# Patient Record
Sex: FEMALE | Race: WHITE | NOT HISPANIC OR LATINO | Employment: FULL TIME | URBAN - METROPOLITAN AREA
[De-identification: names, ages, dates, MRNs, and addresses within clinical notes are randomized per-mention and may not be internally consistent; named-entity substitution may affect disease eponyms.]

---

## 2017-01-03 ENCOUNTER — TRANSFERRED RECORDS (OUTPATIENT)
Dept: FAMILY MEDICINE | Facility: CLINIC | Age: 47
End: 2017-01-03

## 2017-01-05 ENCOUNTER — TELEPHONE (OUTPATIENT)
Dept: FAMILY MEDICINE | Facility: CLINIC | Age: 47
End: 2017-01-05

## 2017-01-05 NOTE — TELEPHONE ENCOUNTER
Called patient with MRI results.  She states she saw Dr Keyla Perdue today and they are ordering PT that will be done at [facility].

## 2017-02-06 ENCOUNTER — TRANSFERRED RECORDS (OUTPATIENT)
Dept: FAMILY MEDICINE | Facility: CLINIC | Age: 47
End: 2017-02-06

## 2017-03-15 ENCOUNTER — OFFICE VISIT (OUTPATIENT)
Dept: FAMILY MEDICINE | Facility: CLINIC | Age: 47
End: 2017-03-15

## 2017-03-15 VITALS
SYSTOLIC BLOOD PRESSURE: 116 MMHG | HEART RATE: 79 BPM | OXYGEN SATURATION: 98 % | BODY MASS INDEX: 35.67 KG/M2 | WEIGHT: 221 LBS | DIASTOLIC BLOOD PRESSURE: 72 MMHG

## 2017-03-15 DIAGNOSIS — M75.102 ROTATOR CUFF SYNDROME, LEFT: Primary | ICD-10-CM

## 2017-03-15 PROCEDURE — 99213 OFFICE O/P EST LOW 20 MIN: CPT | Performed by: FAMILY MEDICINE

## 2017-03-15 NOTE — MR AVS SNAPSHOT
"              After Visit Summary   3/15/2017    Kourtney Rondon    MRN: [id number]           Patient Information     Date Of Birth          1970        Visit Information        Provider Department      3/15/2017 9:15 AM Nathan Morris MD [facility]        Today's Diagnoses     Rotator cuff syndrome, left    -  1       Follow-ups after your visit        Who to contact     If you have questions or need follow up information about today's clinic visit or your schedule please contact [facility] directly at [phone].  Normal or non-critical lab and imaging results will be communicated to you by [employer], letter or phone within 4 business days after the clinic has received the results. If you do not hear from us within 7 days, please contact the clinic through [employer] or phone. If you have a critical or abnormal lab result, we will notify you by phone as soon as possible.  Submit refill requests through [employer] or call your pharmacy and they will forward the refill request to us. Please allow 3 business days for your refill to be completed.          Additional Information About Your Visit        MyChart Information     [employer] lets you send messages to your doctor, view your test results, renew your prescriptions, schedule appointments and more. To sign up, go to [email or web address] . Click on \"Log in\" on the left side of the screen, which will take you to the Welcome page. Then click on \"Sign up Now\" on the right side of the page.     You will be asked to enter the access code listed below, as well as some personal information. Please follow the directions to create your username and password.     Your access code is: [email or web address]  Expires: 3/30/2017  9:01 AM     Your access code will  in 90 days. If you need help or a new code, please call your [place] clinic or [phone].        Care EveryWhere ID     This is your Care EveryWhere ID. This could be used by other " organizations to access your [place] medical records  [id number]        Your Vitals Were     Pulse Pulse Oximetry BMI (Body Mass Index)             79 98% 35.67 kg/m2          Blood Pressure from Last 3 Encounters:   03/15/17 116/72   12/30/16 102/70   06/14/16 100/76    Weight from Last 3 Encounters:   03/15/17 100.2 kg (221 lb)   12/30/16 97.5 kg (215 lb)   06/14/16 97.5 kg (215 lb)              Today, you had the following     No orders found for display         Today's Medication Changes          These changes are accurate as of: 3/15/17  9:32 AM.  If you have any questions, ask your nurse or doctor.               These medicines have changed or have updated prescriptions.        Dose/Directions    SYNTHROID PO   This may have changed:  Another medication with the same name was removed. Continue taking this medication, and follow the directions you see here.   Changed by:  Nathan Morris MD        Dose:  150 mcg   Take 150 mcg by mouth   Refills:  0                Primary Care Provider Office Phone # Fax #    Nathan Morris [phone]       [facility] [address] [facility] [address]        Thank you!     Thank you for choosing [facility]  for your care. Our goal is always to provide you with excellent care. Hearing back from our patients is one way we can continue to improve our services. Please take a few minutes to complete the written survey that you may receive in the mail after your visit with us. Thank you!             Your Updated Medication List - Protect others around you: Learn how to safely use, store and throw away your medicines at www.disposemymeds.org.          This list is accurate as of: 3/15/17  9:32 AM.  Always use your most recent med list.                   Brand Name Dispense Instructions for use    ALEVE PO      Take by mouth daily       BIOTIN PO          buPROPion 150 MG 12 hr tablet    WELLBUTRIN SR         citalopram 40 MG  tablet    celeXA     Take 40 mg by mouth daily.       gabapentin 100 MG capsule    NEURONTIN    180 capsule    Take 1 capsule by mouth bid.       SYNTHROID PO      Take 150 mcg by mouth       VITAMIN B-12 PO

## 2017-03-15 NOTE — PROGRESS NOTES
She has rotator cuff syndrome of the left shoulder, starting PT, but her therapist suggested no lifting over 5#.   OBJECTIVE: /72  Pulse 79  Wt 100.2 kg (221 lb)  SpO2 98%  BMI 35.67 kg/m2   NAD =/- supraspinatus sign. Painful to abduct above shoulder. Forward flexion OK to shoulder.   (M75.102) Rotator cuff syndrome, left  (primary encounter diagnosis)  Comment:   Plan: letter written to lift no more than 5#, and not above her waist. 2 month limit.

## 2017-07-22 ENCOUNTER — HEALTH MAINTENANCE LETTER (OUTPATIENT)
Age: 47
End: 2017-07-22

## 2017-07-24 ENCOUNTER — TELEPHONE (OUTPATIENT)
Dept: FAMILY MEDICINE | Facility: CLINIC | Age: 47
End: 2017-07-24

## 2017-07-24 NOTE — TELEPHONE ENCOUNTER
Patient calls requesting note from Dr. Morris to release her from 5 lb lifting restriction at work. Saw Dr. Morris in March for shoulder problem and letter with this restriction was given. Patient reports shoulder if feeling good but needs MD not to release from this restriction.   Plan: ok per Dr. Morris for letter stating ok to work without any restrictions. Letter done and patient will pick it up tomorrow at .  Tammi Longoria RN

## 2017-07-24 NOTE — LETTER
[facility] [address] [place] [id number] [phone]        July 24, 2017      To whom it may concern--     Kourtney Rondon may work without any restrictions. Lifting restriction no longer necessary.               Nathan Morris MD

## 2018-05-03 ENCOUNTER — TRANSFERRED RECORDS (OUTPATIENT)
Dept: FAMILY MEDICINE | Facility: CLINIC | Age: 48
End: 2018-05-03

## 2018-05-08 ENCOUNTER — TRANSFERRED RECORDS (OUTPATIENT)
Dept: FAMILY MEDICINE | Facility: CLINIC | Age: 48
End: 2018-05-08

## 2018-05-10 ENCOUNTER — TRANSFERRED RECORDS (OUTPATIENT)
Dept: FAMILY MEDICINE | Facility: CLINIC | Age: 48
End: 2018-05-10

## 2018-05-22 ENCOUNTER — TRANSFERRED RECORDS (OUTPATIENT)
Dept: FAMILY MEDICINE | Facility: CLINIC | Age: 48
End: 2018-05-22

## 2018-06-14 ENCOUNTER — TRANSFERRED RECORDS (OUTPATIENT)
Dept: FAMILY MEDICINE | Facility: CLINIC | Age: 48
End: 2018-06-14

## 2018-07-12 ENCOUNTER — TRANSFERRED RECORDS (OUTPATIENT)
Dept: FAMILY MEDICINE | Facility: CLINIC | Age: 48
End: 2018-07-12

## 2018-08-20 ENCOUNTER — TRANSFERRED RECORDS (OUTPATIENT)
Dept: FAMILY MEDICINE | Facility: CLINIC | Age: 48
End: 2018-08-20

## 2018-09-20 ENCOUNTER — TRANSFERRED RECORDS (OUTPATIENT)
Dept: FAMILY MEDICINE | Facility: CLINIC | Age: 48
End: 2018-09-20

## 2019-03-10 ENCOUNTER — TRANSFERRED RECORDS (OUTPATIENT)
Dept: FAMILY MEDICINE | Facility: CLINIC | Age: 49
End: 2019-03-10

## 2019-03-29 ENCOUNTER — TRANSFERRED RECORDS (OUTPATIENT)
Dept: FAMILY MEDICINE | Facility: CLINIC | Age: 49
End: 2019-03-29

## 2019-04-24 ENCOUNTER — TRANSFERRED RECORDS (OUTPATIENT)
Dept: FAMILY MEDICINE | Facility: CLINIC | Age: 49
End: 2019-04-24

## 2019-05-17 ENCOUNTER — TRANSFERRED RECORDS (OUTPATIENT)
Dept: FAMILY MEDICINE | Facility: CLINIC | Age: 49
End: 2019-05-17

## 2019-06-13 ENCOUNTER — TRANSFERRED RECORDS (OUTPATIENT)
Dept: FAMILY MEDICINE | Facility: CLINIC | Age: 49
End: 2019-06-13

## 2019-06-27 ENCOUNTER — TRANSFERRED RECORDS (OUTPATIENT)
Dept: FAMILY MEDICINE | Facility: CLINIC | Age: 49
End: 2019-06-27

## 2019-07-01 ENCOUNTER — TRANSFERRED RECORDS (OUTPATIENT)
Dept: FAMILY MEDICINE | Facility: CLINIC | Age: 49
End: 2019-07-01

## 2019-09-05 ENCOUNTER — TRANSFERRED RECORDS (OUTPATIENT)
Dept: FAMILY MEDICINE | Facility: CLINIC | Age: 49
End: 2019-09-05

## 2019-09-09 ENCOUNTER — TRANSFERRED RECORDS (OUTPATIENT)
Dept: FAMILY MEDICINE | Facility: CLINIC | Age: 49
End: 2019-09-09

## 2019-09-12 ENCOUNTER — OFFICE VISIT (OUTPATIENT)
Dept: FAMILY MEDICINE | Facility: CLINIC | Age: 49
End: 2019-09-12

## 2019-09-12 VITALS
BODY MASS INDEX: 34.86 KG/M2 | WEIGHT: 216 LBS | SYSTOLIC BLOOD PRESSURE: 94 MMHG | HEART RATE: 88 BPM | DIASTOLIC BLOOD PRESSURE: 70 MMHG | OXYGEN SATURATION: 96 % | RESPIRATION RATE: 16 BRPM

## 2019-09-12 DIAGNOSIS — M25.562 CHRONIC PAIN OF LEFT KNEE: ICD-10-CM

## 2019-09-12 DIAGNOSIS — E03.9 ACQUIRED HYPOTHYROIDISM: ICD-10-CM

## 2019-09-12 DIAGNOSIS — Z01.818 PREOP GENERAL PHYSICAL EXAM: Primary | ICD-10-CM

## 2019-09-12 DIAGNOSIS — G89.29 CHRONIC PAIN OF LEFT KNEE: ICD-10-CM

## 2019-09-12 LAB
% GRANULOCYTES: 76.2 % (ref 42.2–75.2)
HCT VFR BLD AUTO: 39.1 % (ref 35–46)
HEMOGLOBIN: 13.1 G/DL (ref 11.8–15.5)
LYMPHOCYTES NFR BLD AUTO: 17.9 % (ref 20.5–51.1)
MCH RBC QN AUTO: 30.5 PG (ref 27–31)
MCHC RBC AUTO-ENTMCNC: 33.5 G/DL (ref 33–37)
MCV RBC AUTO: 91 FL (ref 80–100)
MONOCYTES NFR BLD AUTO: 5.9 % (ref 1.7–9.3)
PLATELET # BLD AUTO: 222 K/UL (ref 140–450)
RBC # BLD AUTO: 4.3 X10/CMM (ref 3.7–5.2)
WBC # BLD AUTO: 6.9 X10/CMM (ref 3.8–11)

## 2019-09-12 PROCEDURE — 99214 OFFICE O/P EST MOD 30 MIN: CPT | Mod: 25 | Performed by: FAMILY MEDICINE

## 2019-09-12 PROCEDURE — 85025 COMPLETE CBC W/AUTO DIFF WBC: CPT | Performed by: FAMILY MEDICINE

## 2019-09-12 PROCEDURE — 36415 COLL VENOUS BLD VENIPUNCTURE: CPT | Performed by: FAMILY MEDICINE

## 2019-09-12 PROCEDURE — 90471 IMMUNIZATION ADMIN: CPT | Performed by: FAMILY MEDICINE

## 2019-09-12 PROCEDURE — 93000 ELECTROCARDIOGRAM COMPLETE: CPT | Performed by: FAMILY MEDICINE

## 2019-09-12 PROCEDURE — 90686 IIV4 VACC NO PRSV 0.5 ML IM: CPT | Performed by: FAMILY MEDICINE

## 2019-09-12 RX ORDER — TRAMADOL HYDROCHLORIDE 50 MG/1
50 TABLET ORAL EVERY 6 HOURS PRN
COMMUNITY
End: 2020-09-18

## 2019-09-12 NOTE — PROGRESS NOTES
[facility]  [address] [place] [id number] [phone]  Dept: [phone]    PRE-OP EVALUATION:  Today's date: 2019    Kourtney Rondon (: 1970) presents for pre-operative evaluation assessment as requested by Dr. Nacho Cheng.  She requires evaluation and anesthesia risk assessment prior to undergoing surgery/procedure for treatment of Lft knee replace .    Proposed Surgery/ Procedure: left knee replace  Date of Surgery/ Procedure: 10/2/19  Time of Surgery/ Procedure: 6:30am  Hospital/Surgical Facility: [facility]  [phone]  Primary Physician: Walt Darby  Type of Anesthesia Anticipated: to be determined    Patient has a Health Care Directive or Living Will:  YES     1. NO - Do you have a history of heart attack, stroke, stent, bypass or surgery on an artery in the head, neck, heart or legs?  2. NO - Do you ever have any pain or discomfort in your chest?  3. NO - Do you have a history of  Heart Failure?  4. NO - Are you troubled by shortness of breath when: walking on the level, up a slight hill or at night?  5. NO - Do you currently have a cold, bronchitis or other respiratory infection?  6. NO - Do you have a cough, shortness of breath or wheezing?  7. NO - Do you sometimes get pains in the calves of your legs when you walk?  8. NO - Do you or anyone in your family have previous history of blood clots?  9. NO - Do you or does anyone in your family have a serious bleeding problem such as prolonged bleeding following surgeries or cuts?  10. NO - Have you ever had problems with anemia or been told to take iron pills?  11. NO - Have you had any abnormal blood loss such as black, tarry or bloody stools, or abnormal vaginal bleeding?  12. NO - Have you ever had a blood transfusion?  13. NO - Have you or any of your relatives ever had problems with anesthesia?  14. NO - Do you have sleep apnea, excessive snoring or daytime drowsiness?  15. NO - Do you have any prosthetic  heart valves?  16. NO - Do you have prosthetic joints?  17. NO - Is there any chance that you may be pregnant?      HPI:     HPI related to upcoming procedure: ongoing left knee pain, related to injury to the meniscus and repair and now a subsequent injury 6 months ago.    See problem list for active medical problems.  Problems all longstanding and stable, except as noted/documented.  See ROS for pertinent symptoms related to these conditions.    MEDICAL HISTORY:     Patient Active Problem List    Diagnosis Date Noted     Health Care Home 2013     Priority: Medium     State Tier Level:  Tier 1           Depression      Priority: Medium     Lumbago 2012     Priority: Medium      Past Medical History:   Diagnosis Date     ACP (advance care planning) 13    form given today     Depression      Hypothyroid      Past Surgical History:   Procedure Laterality Date     CARPAL TUNNEL RELEASE RT/LT  13    right wrist      SECTION  2009     x 2     CHOLECYSTECTOMY, LAPOROSCOPIC  2011     CYSTOSCOPY, LITHOTRIPSY, COMBINED       right elbow tendonitis  13    release - Dr Perdue     Current Outpatient Medications   Medication Sig Dispense Refill     BIOTIN PO        buPROPion (WELLBUTRIN SR) 150 MG 12 hr tablet        citalopram (CELEXA) 40 MG tablet Take 40 mg by mouth daily.        Cyanocobalamin (VITAMIN B-12 PO)        gabapentin (NEURONTIN) 100 MG capsule Take 1 capsule by mouth bid. 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    EXAM:   BP 94/70   Pulse 88   Resp 16   Wt 98 kg (216 lb)   LMP 08/15/2019 (Approximate)   SpO2 96%   BMI 34.86 kg/m      GENERAL APPEARANCE: healthy, alert and no distress     EYES: EOMI, PERRL     HENT: ear canals and TM's normal and nose and mouth without ulcers or lesions     NECK: no adenopathy, no asymmetry, masses, or scars and thyroid normal to palpation     RESP: lungs clear to auscultation - no rales, rhonchi or wheezes     CV: regular rates and rhythm, normal S1 S2, no S3 or S4 and no murmur, click or rub      MS: extremities normal- no gross deformities noted, no evidence of inflammation in joints, FROM in all extremities.     SKIN: no suspicious lesions or rashes     NEURO: Normal strength and tone, sensory exam grossly normal, mentation intact and speech normal     PSYCH: mentation appears normal. and affect normal/bright     LYMPHATICS: No cervical adenopathy    DIAGNOSTICS:   EKG: appears normal, NSR, normal axis, normal intervals, no acute ST/T changes c/w ischemia, no LVH by voltage criteria, unchanged from previous tracings    Recent Labs   Lab Test 11/04/15  0943 03/11/14  0934 09/17/13  0957   HGB  --  13.4 12.9   PLT  --  222 221     --   --    POTASSIUM 4.4  --   --    CR 0.83  --   --         IMPRESSION:   Reason for surgery/procedure: left knee pain  Diagnosis/reason for consult: preoperative clearance    The proposed surgical procedure is considered LOW risk.    REVISED CARDIAC RISK INDEX  The patient has the following serious cardiovascular risks for perioperative complications such as (MI, PE, VFib and 3  AV Block):  No serious cardiac risks  INTERPRETATION: 0 risks: Class I (very low risk - 0.4%  complication rate)    The patient has the following additional risks for perioperative complications:  No identified additional risks      ICD-10-CM    1. Preop general physical exam Z01.818    2. Chronic pain of left knee M25.562 CBC with Diff/Plt ([facility])    G89.29    3. Acquired hypothyroidism E03.9 EKG 12-lead complete w/read - Clinics       RECOMMENDATIONS:     --Patient is to take all scheduled medications on the day of surgery EXCEPT for modifications listed below.    APPROVAL GIVEN to proceed with proposed procedure, without further diagnostic evaluation       Signed Electronically by: Walt Darby MD    Copy of this evaluation report is provided to requesting physician.    [facility] Preop Guidelines    Revised Cardiac Risk Index

## 2019-09-16 NOTE — PROGRESS NOTES
9/12/19 faxed preop, EKG and labs to [facility] @ [phone]    Jason Knowles,   [facility]  [phone]

## 2019-10-14 ENCOUNTER — TRANSFERRED RECORDS (OUTPATIENT)
Dept: FAMILY MEDICINE | Facility: CLINIC | Age: 49
End: 2019-10-14

## 2019-11-11 ENCOUNTER — TRANSFERRED RECORDS (OUTPATIENT)
Dept: FAMILY MEDICINE | Facility: CLINIC | Age: 49
End: 2019-11-11

## 2019-12-02 ENCOUNTER — TRANSFERRED RECORDS (OUTPATIENT)
Dept: FAMILY MEDICINE | Facility: CLINIC | Age: 49
End: 2019-12-02

## 2020-03-03 ENCOUNTER — TRANSFERRED RECORDS (OUTPATIENT)
Dept: FAMILY MEDICINE | Facility: CLINIC | Age: 50
End: 2020-03-03

## 2020-04-15 RX ORDER — ROSUVASTATIN CALCIUM 10 MG/1
1 TABLET, COATED ORAL DAILY
COMMUNITY
Start: 2020-01-22 | End: 2020-04-29

## 2020-04-15 RX ORDER — ROSUVASTATIN CALCIUM 10 MG/1
10 TABLET, COATED ORAL DAILY
Qty: 90 TABLET | Refills: 1 | Status: CANCELLED | OUTPATIENT
Start: 2020-04-15

## 2020-04-29 RX ORDER — LEVOTHYROXINE SODIUM 175 UG/1
175 TABLET ORAL DAILY
COMMUNITY
End: 2023-03-03

## 2020-04-30 ENCOUNTER — VIRTUAL VISIT (OUTPATIENT)
Dept: FAMILY MEDICINE | Facility: CLINIC | Age: 50
End: 2020-04-30

## 2020-04-30 DIAGNOSIS — R05.9 COUGH: Primary | ICD-10-CM

## 2020-04-30 PROCEDURE — 99213 OFFICE O/P EST LOW 20 MIN: CPT | Mod: 95 | Performed by: NURSE PRACTITIONER

## 2020-04-30 NOTE — PROGRESS NOTES
"Problem(s) Oriented visit        SUBJECTIVE:                                                    Kourtney Rondon is a 49 year old female who presents for a telephone visit regarding the following health issues: cough    Pt unable to operate video technology at the time of appt, therefore, switched to telephone visit    The patient has been notified of following:     \"This telephone visit will be conducted via a call between you and your physician/provider. We have found that certain health care needs can be provided without the need for an in-person physical exam.  This service lets us provide the care you need with a telephone conversation.  If a prescription is necessary we can send it directly to your pharmacy.  If lab work is needed we can place an order for that and you can then stop by our lab to have the test done at a later time.    If during the course of the call the physician/provider feels a telephone visit is not appropriate, you will not be charged for this service.\"     Provider has received verbal consent for a telephone visit from the patient? Yes    Kourtney is feeling generally well. However, she has had a dry persistent cough x3 weeks, initially was bad the first week, now stable the past couple of weeks, worse at night- tends to have a coughing fit at night. Slight headache, tired, and mildly achy but she thinks this may just be due to work- very active as a  and these symptoms are her baseline. Does have seasonal allergies- takes OTC Allegra and Zyrtec but finds these wear off by the evening. Having rhinorrhea, sinus congestion, itchy eyes, and itchy ears. Notes mild nausea and increased heartburn. No fevers, chills, anosmia, changes in taste, sore throat, SOB/DANG, chest pain or tightness, vomiting or diarrhea. Former smoker- quit several years ago.  is having a dry cough as well. 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       Problem list, Medication list, " Allergies, and Medical/Social/Surgical histories [facility] and updated as appropriate.   Additional history: as documented    ROS:  Constitutional, HEENT, resp, CV, GI negative except as listed per HPI    Histories:   Patient Active Problem List   Diagnosis     [facility]     Depression     Acquired hypothyroidism     Past Surgical History:   Procedure Laterality Date     ARTHROSCOPY KNEE RT/LT Left      CARPAL TUNNEL RELEASE RT/LT  13    right wrist      SECTION  2009     x 2     CHOLECYSTECTOMY, LAPOROSCOPIC  2011     CYSTOSCOPY, LITHOTRIPSY, COMBINED  2012     ENDOSCOPIC RELEASE ULNAR NERVE (ELBOW) Right      TUBAL LIGATION         Social History     Tobacco Use     Smoking status: Former Smoker     Packs/day: 0.50     Years: 15.00     Pack years: 7.50     Types: Cigarettes     Last attempt to quit: 2008     Years since quittin.9     Smokeless tobacco: Never Used   Substance Use Topics     Alcohol use: No     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        levothyroxine (SYNTHROID/LEVOTHROID) 175 MCG tablet Take 175 mcg by mouth daily       Naproxen Sodium (ALEVE PO) Take by mouth daily       BIOTIN PO        Cyanocobalamin (VITAMIN B-12 PO)        gabapentin (NEURONTIN) 100 MG capsule Take 1 capsule by mouth bid. (Patient not taking: Reported on 2020) 180 capsule 1     Levothyroxine Sodium (SYNTHROID PO) Take 150 mcg by mouth       traMADol (ULTRAM) 50 MG tablet Take 50 mg by mouth every 6 hours as needed for severe pain         OBJECTIVE:                                                     No vitals taken- telephone visit  Constitutional: Alert and oriented  Resp: Slight nagging cough during conversation, able to speak full sentences  Psych: Pleasant and interactive, answers questions appropriately, thought content logical       ASSESSMENT/PLAN:                                                        Kourtney was seen today for cough.    Diagnoses and all orders for this visit:    Cough    Suspect that her cough is due to allergic rhinitis and post-nasal drainage- recommend she start fluticasone nasal spray 1-2 sprays BID, consider switch of antihistamine to levocetirizine. May consider adding in sinuses rinses as well. If symptoms fail to improve, consider famotidine for her heartburn. However, given her level of exposure and persistent cough, recommend she visit [email or web address] to discuss COVID19 testing.    Patient needs assistance with ADLs: none identified today  Patient needs assistance with iADLs: none identified today    The following health maintenance items are reviewed in Epic and correct as of today:  Health Maintenance   Topic Date Due     PREVENTIVE CARE VISIT  1970     DEPRESSION ACTION PLAN  1970     HIV SCREENING  07/21/1985     TSH W/FREE T4 REFLEX  09/17/2014     PAP  04/01/2015     LIPID  07/21/2015     PHQ-9  06/30/2017     DTAP/TDAP/TD IMMUNIZATION (2 - Td) 07/01/2020     ZOSTER IMMUNIZATION (1 of 2) 07/21/2020     INFLUENZA VACCINE  Completed     IPV IMMUNIZATION  Aged Out     MENINGITIS IMMUNIZATION  Aged Out       This was a virtual telephone visit conducted during COVID-19 outbreak in regulation with social distancing and quarantine recommendations of the CDC and [place] department of health and human services. A two way audio connection was used in real time with patient's consent.      CARLITA Nix CNP  [facility]  Family Practice  [facility]  [phone]    For any issues my office # is  [phone]

## 2020-05-02 ENCOUNTER — VIRTUAL VISIT (OUTPATIENT)
Dept: FAMILY MEDICINE | Facility: OTHER | Age: 50
End: 2020-05-02

## 2020-05-02 NOTE — PROGRESS NOTES
"Date: 2020 13:28:27  Clinician: Parish Quintero  Clinician NPI: [id number]  Patient: Kourtney Rondon  Patient : 1970  Patient Address: [address] [place] [address]/[address] [place] [id number]  Patient Phone: [phone]  Visit Protocol: URI  Patient Summary:  Kourtney is a 49 year old ( : 1970 ) female who initiated a Visit for COVID-19 (Coronavirus) evaluation and screening. When asked the question \"Please sign me up to receive news, health information and promotions from [employer].\", Kourtney responded \"No\".    Kourtney states her symptoms started suddenly 10-13 days ago. After her symptoms started, they improved and then got worse again.   Her symptoms consist of rhinitis, a cough, nasal congestion, a headache, and malaise. She is experiencing difficulty breathing due to nasal congestion but she is not short of breath.   Symptom details     Nasal secretions: The color of her mucus is clear.    Cough: Kourtney coughs every 5-10 minutes and her cough is more bothersome at night. Phlegm does not come into her throat when she coughs. She does not believe her cough is caused by post-nasal drip.     Headache: She states the headache is moderate (4-6 on a 10 point pain scale).      Kourtney denies having fever, myalgias, facial pain or pressure, sore throat, vomiting, nausea, teeth pain, ageusia, anosmia, diarrhea, ear pain, wheezing, enlarged lymph nodes, and chills. She also denies taking antibiotic medication for the symptoms, having a sinus infection within the past year, and having recent facial or sinus surgery in the past 60 days.   Precipitating events  She has not recently been exposed to someone with influenza. Kourtney has been in close contact with the following high risk individuals: adults 65 or older and people with asthma, heart disease or diabetes.   Pertinent COVID-19 (Coronavirus) information  Kourtney does not work or volunteer as healthcare worker or a  and does not work or volunteer in a " healthcare facility.   She does not live with a healthcare worker.   Kourtney has had a close contact with a laboratory-confirmed COVID-19 patient within 14 days of symptom onset. She also has had a close contact with a suspected COVID-19 patient within 14 days of symptom onset. Additional information about contact with COVID-19 (Coronavirus) patient as reported by the patient (free text): I am a . Found out today that a fellow coworker whom I've been in contact with is tested for covid   Pertinent medical history  Kourtney does not get yeast infections when she takes antibiotics.   Kourtney does not need a return to work/school note.   Weight: 212 lbs   Kourtney does not smoke or use smokeless tobacco.   She denies pregnancy and denies breastfeeding. She has menstruated in the past month.   Weight: 212 lbs    MEDICATIONS: Synthroid oral, bupropion HCl oral, citalopram oral, ALLERGIES: Latex, Natural Rubber  Clinician Response:  Dear Kourtney,   Your symptoms show that you may have coronavirus (COVID-19). This illness can cause fever, cough and trouble breathing. Many people get a mild case and get better on their own. Some people can get very sick.   Will I be tested for COVID-19?  We would recommend you be tested for coronavirus. Here is how to get that scheduled:   Call [phone]. Tell them you were referred by [facility] to have a COVID-19 test. You will be scheduled at one of our [facility] testing locations (drive-up). Please have your [facility] visit information ready when you call including your visit ID number to verify you were referred.    How can I protect others in the meantime?  First, stay home and away from others (self-isolate) until:   You've had no fever---and no medicine that reduces fever---for 3 full days (72 hours). And...    Your other symptoms have gotten better. For example, your cough or breathing has improved. And...    At least 7 days have passed since your symptoms started.   During this time:    Don't go to work, school or anywhere else.     Stay away from others in your home. No hugging, kissing or shaking hands.    Don't let anyone visit.    Cover your mouth and nose with a mask, tissue or wash cloth to avoid spreading germs.    Wash your hands and face often. Use soap and water.   How can I take care of myself?  1.Take Tylenol (acetaminophen) for fever or pain. If you have liver or kidney problems, ask your family doctor if it's okay to take Tylenol.   Adults can take either:    650 mg (two 325 mg pills) every 4 to 6 hours, or...    1,000 mg (two 500 mg pills) every 8 hours as needed.     Note: Don't take more than 3,000 mg in one day.   For children, check the Tylenol bottle for the right dose. The dose is based on the child's age or weight.  2.If you have other health problems (like cancer, heart failure, an organ transplant or severe kidney disease): Call your specialty clinic if you don't feel better in the next 2 days.  3.Know when to call 911: If your breathing is so bad that it keeps you from doing normal activities, call 911 or go to the emergency room. Tell them that you've been staying home and may have COVID-19.  4.Sign up for [employer]. We know it's scary to hear that you might have COVID-19. We want to track your symptoms to make sure you're okay over the next 2 weeks. Please look for an email from [employer]---this is a free, online program that we'll use to keep in touch. To sign up, follow the link in the email. Learn more at [email or web address].  Where can I get more information?  To learn more about COVID-19 and how to care for yourself at home, please visit the CDC website at https://www.cdc.gov/coronavirus/2019-ncov/about/steps-when-sick.html.  For more about your care at [facility], please visit [email or web address]     Diagnosis: Acute upper respiratory infection, unspecified  Diagnosis ICD: J06.9

## 2020-05-03 ENCOUNTER — OFFICE VISIT (OUTPATIENT)
Dept: URGENT CARE | Facility: URGENT CARE | Age: 50
End: 2020-05-03
Payer: COMMERCIAL

## 2020-05-03 DIAGNOSIS — Z20.822 SUSPECTED COVID-19 VIRUS INFECTION: Primary | ICD-10-CM

## 2020-05-03 PROCEDURE — 87635 SARS-COV-2 COVID-19 AMP PRB: CPT | Mod: 90 | Performed by: FAMILY MEDICINE

## 2020-05-03 PROCEDURE — 99207 ZZC NO BILLABLE SERVICE THIS VISIT: CPT

## 2020-05-03 PROCEDURE — 99000 SPECIMEN HANDLING OFFICE-LAB: CPT | Performed by: FAMILY MEDICINE

## 2020-05-04 LAB
SARS-COV-2 RNA SPEC QL NAA+PROBE: NOT DETECTED
SPECIMEN SOURCE: NORMAL

## 2020-05-06 ENCOUNTER — NURSE TRIAGE (OUTPATIENT)
Dept: NURSING | Facility: CLINIC | Age: 50
End: 2020-05-06

## 2020-05-06 NOTE — TELEPHONE ENCOUNTER
Cough x 3 weeks. , coworker tested positive (found out Sat). Patient was tested on Sun in [place]. 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   5/3/2020 per chart review: coronavirus not detected.   Patient will wait for final result call back from the lab.       Reason for Disposition    Health Information question, no triage required and triager able to answer question    Protocols used: INFORMATION ONLY CALL-A-

## 2020-09-18 ENCOUNTER — VIRTUAL VISIT (OUTPATIENT)
Dept: FAMILY MEDICINE | Facility: CLINIC | Age: 50
End: 2020-09-18

## 2020-09-18 DIAGNOSIS — E03.9 ACQUIRED HYPOTHYROIDISM: ICD-10-CM

## 2020-09-18 DIAGNOSIS — Z20.822 SUSPECTED COVID-19 VIRUS INFECTION: ICD-10-CM

## 2020-09-18 DIAGNOSIS — R53.83 FATIGUE, UNSPECIFIED TYPE: Primary | ICD-10-CM

## 2020-09-18 PROCEDURE — 99213 OFFICE O/P EST LOW 20 MIN: CPT | Mod: 95 | Performed by: NURSE PRACTITIONER

## 2020-09-18 RX ORDER — FEXOFENADINE HCL 60 MG/1
60 TABLET, FILM COATED ORAL
COMMUNITY
End: 2020-09-18

## 2020-09-18 NOTE — PROGRESS NOTES
Problem(s) Oriented visit      Video-Visit Details    Type of service:  Video Visit    Video Start Time (time video started): 1022    Video End Time (time video stopped): 1032    Originating Location (pt. Location): Home    Distant Location (provider location):  [facility]     Mode of Communication:  Video Conference via Facetime    Physician has received verbal consent for a Video Visit from the patient? Yes    This was a virtual video-visit conducted during COVID-19 outbreak in regulation with social distancing and quarantine recommendations of the CDC and [place] department of health and human services. A two way audio/video connection was used in real time with patient's consent.    CARLITA Munson CNP    SUBJECTIVE:                                                    Kourtney Rondon is a 50 year old female who presents to clinic today for the following health issues :    Symptoms started about one week ago and include fatigue, body aches, occasional shortness of breath. Works as a . Was tested for Covid-19 earlier this year when she had a cough. No exposure to anyone with known or suspected Covid-19.  Has had some intermittent chest pain on left side that radiates to her back. Does not occur or worsen with exertion. Does have family history of heart disease.  Hypothyroid - Taking Levothyroxine 175 mcg daily. Thinks her levels might be off causing her fatigue.   Denies sore throat, runny/stuffy nose, loss of taste/smell, cough, abdominal pain, nausea, vomiting, diarrhea.  Needs work note emailed to her. Email adddress is:  [email or web address]    Problem list, Medication list, Allergies, and Medical/Social/Surgical histories reviewed in [facility] and updated as appropriate.   Additional history: as documented    ROS:  10 point ROS completed and negative except noted above, including Gen, HEENT, CV, Resp, GI, , MS, Neurologic, Psych    Histories:   Patient Active Problem List   Diagnosis      [facility]     Depression     Acquired hypothyroidism     Past Surgical History:   Procedure Laterality Date     ARTHROSCOPY KNEE RT/LT Left      CARPAL TUNNEL RELEASE RT/LT  13    right wrist      SECTION  2009     x 2     CHOLECYSTECTOMY, LAPOROSCOPIC  2011     CYSTOSCOPY, LITHOTRIPSY, COMBINED  2012     ENDOSCOPIC RELEASE ULNAR NERVE (ELBOW) Right      TUBAL LIGATION         Social History     Tobacco Use     Smoking status: Former Smoker     Packs/day: 0.50     Years: 15.00     Pack years: 7.50     Types: Cigarettes     Last attempt to quit: 2008     Years since quittin.3     Smokeless tobacco: Never Used   Substance Use Topics     Alcohol use: No     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     Family History   Problem Relation Age of Onset     Diabetes Brother         Type 1     Diabetes Father         Type 1     Blood Disease Father         PE , he  from,age 59     Pulmonary Embolism Father      Asthma Sister      Heart Failure Mother            OBJECTIVE:                                                    No vitals taken due to telehealth visit    APPEARANCE: = Relaxed and in no distress  Conj/Eyelids = noninjected and lids and lashes are without inflammation  Ears/Nose = External structures and Nares have usual shape and form  Resp effort = Calm regular breathing, no coughing  Recent/Remote Memory = Alert and Oriented x 3  Mood/Affect = Cooperative and interested     ASSESSMENT/PLAN:                                                      Kourtney was seen today for fatigue.    Diagnoses and all orders for this visit:    Fatigue, unspecified type  -     CBC with Diff/Plt ([facility]); Future  -     Comp. Metabolic Panel (14) ([facility]); Future  -     Lipid Panel ([facility]); Future  -     Vitamin D  25-Hydroxy ([facility]); Future  -     Vitamin B12 ([facility]); Future  -     Ferritin  Serum ([facility]); Future  -     TSH ([facility]); Future  -     Thyroxine (T4) Free  Direct  S ([facility]);  Future    Suspected COVID-19 virus infection  -     Symptomatic COVID-19 Virus (Coronavirus) by PCR; Future    Acquired hypothyroidism  -     TSH ([facility]); Future  -     Thyroxine (T4) Free  Direct  S ([facility]); Future        See Patient Instructions  Patient Instructions   Call [phone] to schedule Covid-19 testing.    If testing negative, then you can schedule lab appointment here at clinic          The following health maintenance items are reviewed in Epic and correct as of today:  Health Maintenance   Topic Date Due     PREVENTIVE CARE VISIT  1970     DEPRESSION ACTION PLAN  1970     MAMMO SCREENING  1970     COLORECTAL CANCER SCREENING  07/21/1980     HIV SCREENING  07/21/1985     TSH W/FREE T4 REFLEX  09/17/2014     PAP  04/01/2015     LIPID  07/21/2015     PHQ-9  06/30/2017     DTAP/TDAP/TD IMMUNIZATION (2 - Td) 07/01/2020     INFLUENZA VACCINE (1) 09/01/2020     ZOSTER IMMUNIZATION (1 of 2) 07/21/2020     IPV IMMUNIZATION  Aged Out     MENINGITIS IMMUNIZATION  Aged Out     HEPATITIS B IMMUNIZATION  Aged Out       CARLITA Munson CNP  [facility]  Family Practice  [facility]  [phone]    For any issues my office # is [phone]

## 2020-09-18 NOTE — PATIENT INSTRUCTIONS
Call [phone] to schedule Covid-19 testing.    If testing negative, then you can schedule lab appointment here at clinic

## 2020-09-19 DIAGNOSIS — Z20.822 SUSPECTED COVID-19 VIRUS INFECTION: ICD-10-CM

## 2020-09-19 PROCEDURE — U0003 INFECTIOUS AGENT DETECTION BY NUCLEIC ACID (DNA OR RNA); SEVERE ACUTE RESPIRATORY SYNDROME CORONAVIRUS 2 (SARS-COV-2) (CORONAVIRUS DISEASE [COVID-19]), AMPLIFIED PROBE TECHNIQUE, MAKING USE OF HIGH THROUGHPUT TECHNOLOGIES AS DESCRIBED BY CMS-2020-01-R: HCPCS | Performed by: NURSE PRACTITIONER

## 2020-09-20 LAB
SARS-COV-2 RNA SPEC QL NAA+PROBE: NOT DETECTED
SPECIMEN SOURCE: NORMAL

## 2020-09-21 ENCOUNTER — TELEPHONE (OUTPATIENT)
Dept: FAMILY MEDICINE | Facility: CLINIC | Age: 50
End: 2020-09-21

## 2020-09-22 DIAGNOSIS — R53.83 FATIGUE, UNSPECIFIED TYPE: ICD-10-CM

## 2020-09-22 DIAGNOSIS — E03.9 ACQUIRED HYPOTHYROIDISM: ICD-10-CM

## 2020-09-22 LAB
% GRANULOCYTES: 72.9 % (ref 42.2–75.2)
HCT VFR BLD AUTO: 41.8 % (ref 35–46)
HEMOGLOBIN: 14 G/DL (ref 11.8–15.5)
LYMPHOCYTES NFR BLD AUTO: 21.2 % (ref 20.5–51.1)
MCH RBC QN AUTO: 30.2 PG (ref 27–31)
MCHC RBC AUTO-ENTMCNC: 33.6 G/DL (ref 33–37)
MCV RBC AUTO: 89.7 FL (ref 80–100)
MONOCYTES NFR BLD AUTO: 5.9 % (ref 1.7–9.3)
PLATELET # BLD AUTO: 232 K/UL (ref 140–450)
RBC # BLD AUTO: 4.66 X10/CMM (ref 3.7–5.2)
WBC # BLD AUTO: 5.7 X10/CMM (ref 3.8–11)

## 2020-09-22 PROCEDURE — 85025 COMPLETE CBC W/AUTO DIFF WBC: CPT | Performed by: NURSE PRACTITIONER

## 2020-09-22 PROCEDURE — 36415 COLL VENOUS BLD VENIPUNCTURE: CPT | Performed by: NURSE PRACTITIONER

## 2020-09-23 ENCOUNTER — TELEPHONE (OUTPATIENT)
Dept: FAMILY MEDICINE | Facility: CLINIC | Age: 50
End: 2020-09-23

## 2020-09-23 DIAGNOSIS — R06.02 SOB (SHORTNESS OF BREATH): Primary | ICD-10-CM

## 2020-09-23 DIAGNOSIS — Z82.49 FAMILY HISTORY OF IDIOPATHIC HYPERTROPHIC SUBAORTIC STENOSIS: ICD-10-CM

## 2020-09-23 LAB
ALBUMIN SERPL-MCNC: 4 G/DL (ref 3.8–4.8)
ALBUMIN/GLOB SERPL: 1.8 {RATIO} (ref 1.2–2.2)
ALP SERPL-CCNC: 65 IU/L (ref 39–117)
ALT SERPL-CCNC: 10 IU/L (ref 0–32)
AST SERPL-CCNC: 13 IU/L (ref 0–40)
BILIRUB SERPL-MCNC: 0.6 MG/DL (ref 0–1.2)
BUN SERPL-MCNC: 15 MG/DL (ref 6–24)
BUN/CREATININE RATIO: 19 (ref 9–23)
CALCIUM SERPL-MCNC: 9.3 MG/DL (ref 8.7–10.2)
CHLORIDE SERPLBLD-SCNC: 105 MMOL/L (ref 96–106)
CHOLEST SERPL-MCNC: 194 MG/DL (ref 100–199)
CREAT SERPL-MCNC: 0.77 MG/DL (ref 0.57–1)
EGFR IF AFRICN AM: 104 ML/MIN/1.73
EGFR IF NONAFRICN AM: 90 ML/MIN/1.73
FERRITIN SERPL-MCNC: 56 NG/ML (ref 15–150)
GLOBULIN, TOTAL: 2.2 G/DL (ref 1.5–4.5)
GLUCOSE SERPL-MCNC: 84 MG/DL (ref 65–99)
HDLC SERPL-MCNC: 53 MG/DL
LDL/HDL RATIO: 2.1 RATIO (ref 0–3.2)
LDLC SERPL CALC-MCNC: 111 MG/DL (ref 0–99)
POTASSIUM SERPL-SCNC: 4.7 MMOL/L (ref 3.5–5.2)
PROT SERPL-MCNC: 6.2 G/DL (ref 6–8.5)
SODIUM SERPL-SCNC: 141 MMOL/L (ref 134–144)
T4 FREE SERPL-MCNC: 1.55 NG/DL (ref 0.82–1.77)
TOTAL CO2: 25 MMOL/L (ref 20–29)
TRIGL SERPL-MCNC: 171 MG/DL (ref 0–149)
TSH BLD-ACNC: 0.9 UIU/ML (ref 0.45–4.5)
VIT B12 SERPL-MCNC: 533 PG/ML (ref 232–1245)
VITAMIN D, 25-HYDROXY: 21.4 NG/ML (ref 30–100)
VLDLC SERPL CALC-MCNC: 30 MG/DL (ref 5–40)

## 2020-09-23 NOTE — TELEPHONE ENCOUNTER
Continues to have shortness of breath.    Needs work note:  [email or web address]    Stress echocardiogram ordered. Patient has family history of idiopathic hypertrophic subaortic stenosis.    Lab results reviewed. Patient would like a copy of lab results sent to her.    Patient verbalized understanding and is agreeable to the discussed plan of care.    CARLITA Munson CNP on 9/23/2020 at 1:15 PM

## 2020-09-25 ENCOUNTER — TELEPHONE (OUTPATIENT)
Dept: FAMILY MEDICINE | Facility: CLINIC | Age: 50
End: 2020-09-25

## 2020-09-25 NOTE — LETTER
[facility] [address] [place] [id number] [phone]      September 25, 2020      Kourtney Rondon  [address] [facility] [address]              To whom it may concern:    RE: Kourtney Rondon    Patient was seen and treated in ED on 9/22/2020. Had phone follow-up with myself 9/23/2020 and will need further testing before she can return to work. At this point she can return after testing is completed on Tuesday 9/29/20 pending normal results.    Please contact me for questions or concerns.      Sincerely,        CARLITA Munson CNP

## 2020-09-25 NOTE — TELEPHONE ENCOUNTER
Patient called clinic requesting work note be rewritten as stress test is not scheduled until 9/29/20. Per Naomi Diaz, [employer] this is written. Patient requests letter be mailed to her. Leia Asher

## 2020-09-29 ENCOUNTER — HOSPITAL ENCOUNTER (OUTPATIENT)
Dept: CARDIOLOGY | Facility: CLINIC | Age: 50
Discharge: HOME | End: 2020-09-29
Attending: NURSE PRACTITIONER | Admitting: NURSE PRACTITIONER
Payer: COMMERCIAL

## 2020-09-29 DIAGNOSIS — Z82.49 FAMILY HISTORY OF IDIOPATHIC HYPERTROPHIC SUBAORTIC STENOSIS: ICD-10-CM

## 2020-09-29 DIAGNOSIS — R06.02 SOB (SHORTNESS OF BREATH): ICD-10-CM

## 2020-09-29 PROCEDURE — 93321 DOPPLER ECHO F-UP/LMTD STD: CPT | Mod: 26 | Performed by: INTERNAL MEDICINE

## 2020-09-29 PROCEDURE — 93350 STRESS TTE ONLY: CPT | Mod: 26 | Performed by: INTERNAL MEDICINE

## 2020-09-29 PROCEDURE — 25500064 ZZH RX 255 OP 636: Performed by: NURSE PRACTITIONER

## 2020-09-29 PROCEDURE — 93325 DOPPLER ECHO COLOR FLOW MAPG: CPT | Mod: 26 | Performed by: INTERNAL MEDICINE

## 2020-09-29 PROCEDURE — 93017 CV STRESS TEST TRACING ONLY: CPT

## 2020-09-29 PROCEDURE — 93016 CV STRESS TEST SUPVJ ONLY: CPT | Performed by: INTERNAL MEDICINE

## 2020-09-29 PROCEDURE — 93018 CV STRESS TEST I&R ONLY: CPT | Performed by: INTERNAL MEDICINE

## 2020-09-29 RX ADMIN — HUMAN ALBUMIN MICROSPHERES AND PERFLUTREN 9 ML: 10; .22 INJECTION, SOLUTION INTRAVENOUS at 09:30

## 2020-09-30 DIAGNOSIS — Z91.09 ENVIRONMENTAL ALLERGIES: ICD-10-CM

## 2020-09-30 DIAGNOSIS — R06.02 SOB (SHORTNESS OF BREATH): Primary | ICD-10-CM

## 2020-09-30 NOTE — PROGRESS NOTES
Discussed normal stress echo results with patient. She plans to go back to work tomorrow.    Patient wondering if symptoms could be due to allergies and would like referral to Allergy specialist.    CARLITA Munson CNP on 9/30/2020 at 10:17 AM

## 2020-10-21 ENCOUNTER — TRANSFERRED RECORDS (OUTPATIENT)
Dept: FAMILY MEDICINE | Facility: CLINIC | Age: 50
End: 2020-10-21

## 2020-11-02 ENCOUNTER — VIRTUAL VISIT (OUTPATIENT)
Dept: FAMILY MEDICINE | Facility: CLINIC | Age: 50
End: 2020-11-02

## 2020-11-02 DIAGNOSIS — J38.3 VOCAL CORD DYSFUNCTION: ICD-10-CM

## 2020-11-02 DIAGNOSIS — Z20.822 EXPOSURE TO COVID-19 VIRUS: Primary | ICD-10-CM

## 2020-11-02 PROCEDURE — 99213 OFFICE O/P EST LOW 20 MIN: CPT | Mod: 95 | Performed by: NURSE PRACTITIONER

## 2020-11-02 NOTE — PROGRESS NOTES
Problem(s) Oriented visit      Video-Visit Details    Type of service:  Video Visit    Video Start Time (time video started): 916    Video End Time (time video stopped): 919    Originating Location (pt. Location): Home    Distant Location (provider location):  [facility]     Mode of Communication:  Video Conference via Facetime    Physician has received verbal consent for a Video Visit from the patient? Yes    This was a virtual video-visit conducted during COVID-19 outbreak in regulation with social distancing and quarantine recommendations of the CDC and [place] department of health and human services. A two way audio/video connection was used in real time with patient's consent.    CARLITA Munson CNP    SUBJECTIVE:                                                    Kourtney Rondon is a 50 year old female who presents to clinic today for the following health issues :    Her  was tested for Covid-19 Friday 10/31 and got positive result yesterday . He is currently quarantined in her basement. Patient's daughters getting tested today. Patient would like to get tested also. She and her daughters do not have symptoms. Denies fever, chills, body ache, cough.  Saw allergist for shortness of breath symptoms and diagnosed with vocal cord dysfunction. Will be going to therapy for that.    Problem list, Medication list, Allergies, and Medical/Social/Surgical histories reviewed in [facility] and updated as appropriate.   Additional history: as documented    ROS:  5 point ROS completed and negative except noted above, including Gen, HEENT, CV, Resp    Histories:   Patient Active Problem List   Diagnosis     [facility]     Depression     Acquired hypothyroidism     Vocal cord dysfunction     Past Surgical History:   Procedure Laterality Date     ARTHROSCOPY KNEE RT/LT Left      CARPAL TUNNEL RELEASE RT/LT  13    right wrist      SECTION  2009     x 2     CHOLECYSTECTOMY,  LAPOROSCOPIC       CYSTOSCOPY, LITHOTRIPSY, COMBINED  2012     ENDOSCOPIC RELEASE ULNAR NERVE (ELBOW) Right      TUBAL LIGATION         Social History     Tobacco Use     Smoking status: Former Smoker     Packs/day: 0.50     Years: 15.00     Pack years: 7.50     Types: Cigarettes     Quit date: 2008     Years since quittin.4     Smokeless tobacco: Never Used   Substance Use Topics     Alcohol use: No     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     Family History   Problem Relation Age of Onset     Diabetes Brother         Type 1     Diabetes Father         Type 1     Blood Disease Father         PE , he  from,age 59     Pulmonary Embolism Father      Asthma Sister      Heart Failure Mother            OBJECTIVE:                                                    No vitals taken due to telehealth visit    APPEARANCE: = Relaxed and in no distress  Conj/Eyelids = noninjected and lids and lashes are without inflammation  Ears/Nose = External structures and Nares have usual shape and form  Resp effort = Calm regular breathing, no coughing  Recent/Remote Memory = Alert and Oriented x 3  Mood/Affect = Cooperative and interested     ASSESSMENT/PLAN:                                                      Kourtney was seen today for covid 19 testing.    Diagnoses and all orders for this visit:    Exposure to COVID-19 virus  -     Symptomatic COVID-19 Virus (Coronavirus) by PCR; Future    Vocal cord dysfunction  Diagnosed by allergy. Plan for therapy.      See Patient Instructions  Patient Instructions   Call [phone] to schedule Covid-19 testing    COVID-19 Information  How can I protect others?  If you have symptoms (fever, cough, body aches or trouble breathing):    Stay home and away from others (self-isolate) until:  ? At least 10 days have passed since your symptoms started, And   ? You've had no fever--and no medicine that reduces fever--for 1 full day (24 hours), And    ? Your other symptoms have resolved (gotten  "better).  If you don't show symptoms, but testing showed that you have COVID-19:    Stay home and away from others (self-isolate). Follow the tips under \"How do I self-isolate?\" below for 10 days (20 days if you have a weak immune system).    You don't need to be retested for COVID-19 before going back to school or work. As long as you're fever-free and feeling better, you can go back to school, work and other activities after waiting the 10 or 20 days.   How do I self-isolate?    Stay in your own room, even for meals. Use your own bathroom if you can.    Stay away from others in your home. No hugging, kissing or shaking hands. No visitors.    Don't go to work, school or anywhere else.    Clean \"high touch\" surfaces often (doorknobs, counters, handles). Use household cleaning spray or wipes. You'll find a full list of  on the EPA website: www.epa.gov/pesticide-registration/list-n-disinfectants-use-against-sars-cov-2.    Cover your mouth and nose with a mask or other face covering to avoid spreading germs.    Wash your hands and face often. Use soap and water.    Caregivers in these groups are at risk for severe illness due to COVID-19:  ? People 65 years and older  ? People who live in a nursing home or long-term care facility  ? People with chronic disease (lung, heart, cancer, diabetes, kidney, liver, immunologic)  ? People who have a weakened immune system, including those who:    Are in cancer treatment    Take medicine that weakens the immune system, such as corticosteroids    Had a bone marrow or organ transplant    Have an immune deficiency    Have poorly controlled HIV or AIDS    Are obese (body mass index of 40 or higher)    Smoke regularly    Caregivers should wear gloves while washing dishes, handling laundry and cleaning bedrooms and bathrooms.    Use caution when washing and drying laundry: Don't shake dirty laundry and use the warmest water setting that you can.    For more tips on managing " your health at home, go to www.cdc.gov/coronavirus/2019-ncov/downloads/10Things.pdf.  How can I take care of myself at home?  1. Get lots of rest. Drink extra fluids (unless a doctor has told you not to).    2. Take Tylenol (acetaminophen) for fever or pain. If you have liver or kidney problems, ask your family doctor if it's okay to take Tylenol.     Adults can take either:  ? 650 mg (two 325 mg pills) every 4 to 6 hours, or   ? 1,000 mg (two 500 mg pills) every 8 hours as needed.  ? Note: Don't take more than 3,000 mg in one day. Acetaminophen is found in many medicines (both prescribed and over-the-counter medicines). Read all labels to be sure you don't take too much.   For children, check the Tylenol bottle for the right dose. The dose is based on the child's age or weight.  3. If you have other health problems (like cancer, heart failure, an organ transplant or severe kidney disease): Call your specialty clinic if you don't feel better in the next 2 days.    4. Know when to call 911. Emergency warning signs include:  ? Trouble breathing or shortness of breath  ? Pain or pressure in the chest that doesn't go away  ? Feeling confused like you haven't felt before, or not being able to wake up  ? Bluish-colored lips or face    5. Your doctor may have prescribed a blood thinner medicine. Follow their instructions.  Where can I get more information?    [facility] - About COVID-19: "[email or web address][email or web address]    CDC - What to Do If You're Sick: www.cdc.gov/coronavirus/2019-ncov/about/steps-when-sick.html    CDC - Ending Home Isolation: www.cdc.gov/coronavirus/2019-ncov/hcp/disposition-in-home-patients.html    CDC - Caring for Someone: www.cdc.gov/coronavirus/2019-ncov/if-you-are-sick/care-for-someone.html    [facility] - Interim Guidance for Hospital Discharge to Home: www.health.[facility].[place]/diseases/coronavirus/hcp/hospdischarge.pdf    [facility] clinical trials (COVID-19 research studies):  clinicalaffairs.[facility]-clinical-trials    Below are the COVID-19 hotlines at the [facility] of Health ([facility]). Interpreters are available.  ? For health questions: Call 213-429-9821 or 1-271.985.6886 (7 a.m. to 7 p.m.)  ? For questions about schools and childcare: Call 127-168-7327 or 1-628.648.4455 (7 a.m. to 7 p.m.)    For informational purposes only. Not to replace the advice of your health care provider. Clinically reviewed by the Infection Prevention Team. Copyright   2020 [facility]. All rights reserved. [employer] 265465 - REV 08/04/20.          The following health maintenance items are reviewed in Epic and correct as of today:  Health Maintenance   Topic Date Due     PREVENTIVE CARE VISIT  1970     DEPRESSION ACTION PLAN  1970     MAMMO SCREENING  1970     COLORECTAL CANCER SCREENING  07/21/1980     HIV SCREENING  07/21/1985     HEPATITIS C SCREENING  07/21/1988     PAP  04/01/2015     PHQ-9  06/30/2017     DTAP/TDAP/TD IMMUNIZATION (2 - Td) 07/01/2020     INFLUENZA VACCINE (1) 09/01/2020     ZOSTER IMMUNIZATION (1 of 2) 07/21/2020     TSH W/FREE T4 REFLEX  09/22/2021     LIPID  09/22/2025     Pneumococcal Vaccine: Pediatrics (0 to 5 Years) and At-Risk Patients (6 to 64 Years)  Aged Out     IPV IMMUNIZATION  Aged Out     MENINGITIS IMMUNIZATION  Aged Out     HEPATITIS B IMMUNIZATION  Aged Out       CARLITA Munson CNP  [facility]  Family Practice  [facility]  [phone]    For any issues my office # is [phone]

## 2020-11-02 NOTE — PATIENT INSTRUCTIONS
"Call [phone] to schedule Covid-19 testing    COVID-19 Information  How can I protect others?  If you have symptoms (fever, cough, body aches or trouble breathing):    Stay home and away from others (self-isolate) until:  ? At least 10 days have passed since your symptoms started, And   ? You've had no fever--and no medicine that reduces fever--for 1 full day (24 hours), And    ? Your other symptoms have resolved (gotten better).  If you don't show symptoms, but testing showed that you have COVID-19:    Stay home and away from others (self-isolate). Follow the tips under \"How do I self-isolate?\" below for 10 days (20 days if you have a weak immune system).    You don't need to be retested for COVID-19 before going back to school or work. As long as you're fever-free and feeling better, you can go back to school, work and other activities after waiting the 10 or 20 days.   How do I self-isolate?    Stay in your own room, even for meals. Use your own bathroom if you can.    Stay away from others in your home. No hugging, kissing or shaking hands. No visitors.    Don't go to work, school or anywhere else.    Clean \"high touch\" surfaces often (doorknobs, counters, handles). Use household cleaning spray or wipes. You'll find a full list of  on the EPA website: www.epa.gov/pesticide-registration/list-n-disinfectants-use-against-sars-cov-2.    Cover your mouth and nose with a mask or other face covering to avoid spreading germs.    Wash your hands and face often. Use soap and water.    Caregivers in these groups are at risk for severe illness due to COVID-19:  ? People 65 years and older  ? People who live in a nursing home or long-term care facility  ? People with chronic disease (lung, heart, cancer, diabetes, kidney, liver, immunologic)  ? People who have a weakened immune system, including those who:    Are in cancer treatment    Take medicine that weakens the immune system, such as corticosteroids    Had a " bone marrow or organ transplant    Have an immune deficiency    Have poorly controlled HIV or AIDS    Are obese (body mass index of 40 or higher)    Smoke regularly    Caregivers should wear gloves while washing dishes, handling laundry and cleaning bedrooms and bathrooms.    Use caution when washing and drying laundry: Don't shake dirty laundry and use the warmest water setting that you can.    For more tips on managing your health at home, go to www.cdc.gov/coronavirus/2019-ncov/downloads/10Things.pdf.  How can I take care of myself at home?  1. Get lots of rest. Drink extra fluids (unless a doctor has told you not to).    2. Take Tylenol (acetaminophen) for fever or pain. If you have liver or kidney problems, ask your family doctor if it's okay to take Tylenol.     Adults can take either:  ? 650 mg (two 325 mg pills) every 4 to 6 hours, or   ? 1,000 mg (two 500 mg pills) every 8 hours as needed.  ? Note: Don't take more than 3,000 mg in one day. Acetaminophen is found in many medicines (both prescribed and over-the-counter medicines). Read all labels to be sure you don't take too much.   For children, check the Tylenol bottle for the right dose. The dose is based on the child's age or weight.  3. If you have other health problems (like cancer, heart failure, an organ transplant or severe kidney disease): Call your specialty clinic if you don't feel better in the next 2 days.    4. Know when to call 911. Emergency warning signs include:  ? Trouble breathing or shortness of breath  ? Pain or pressure in the chest that doesn't go away  ? Feeling confused like you haven't felt before, or not being able to wake up  ? Bluish-colored lips or face    5. Your doctor may have prescribed a blood thinner medicine. Follow their instructions.  Where can I get more information?     [employer] [facility] - About COVID-19: [email or web address]    [facility] - What to Do If You're Sick:  www.cdc.gov/coronavirus/2019-ncov/about/steps-when-sick.html    CDC - Ending Home Isolation: www.cdc.gov/coronavirus/2019-ncov/hcp/disposition-in-home-patients.html    CDC - Caring for Someone: www.cdc.gov/coronavirus/2019-ncov/if-you-are-sick/care-for-someone.html    [facility] - Interim Guidance for Hospital Discharge to Home: www.[facility].[place]./diseases/coronavirus/hcp/hospdischarge.pdf    [facility] clinical trials (COVID-19 research studies): clinicalaffairs.[facility]-clinical-trials    Below are the COVID-19 hotlines at the [place] Department of Health ([facility]). Interpreters are available.  ? For health questions: Call 067-149-2460 or 1-898.749.9860 (7 a.m. to 7 p.m.)  ? For questions about schools and childcare: Call 490-710-9611 or 1-590.603.6836 (7 a.m. to 7 p.m.)    For informational purposes only. Not to replace the advice of your health care provider. Clinically reviewed by the Infection Prevention Team. Copyright   2020 [facility]. All rights reserved. [employer] 717823 - REV 08/04/20.

## 2020-11-03 ENCOUNTER — AMBULATORY - HEALTHEAST (OUTPATIENT)
Dept: FAMILY MEDICINE | Facility: CLINIC | Age: 50
End: 2020-11-03

## 2020-11-03 DIAGNOSIS — Z20.822 EXPOSURE TO COVID-19 VIRUS: ICD-10-CM

## 2020-11-04 ENCOUNTER — AMBULATORY - HEALTHEAST (OUTPATIENT)
Dept: FAMILY MEDICINE | Facility: CLINIC | Age: 50
End: 2020-11-04

## 2020-11-04 DIAGNOSIS — Z20.822 EXPOSURE TO COVID-19 VIRUS: ICD-10-CM

## 2020-11-06 ENCOUNTER — COMMUNICATION - HEALTHEAST (OUTPATIENT)
Dept: SCHEDULING | Facility: CLINIC | Age: 50
End: 2020-11-06

## 2021-04-04 ENCOUNTER — HEALTH MAINTENANCE LETTER (OUTPATIENT)
Age: 51
End: 2021-04-04

## 2021-04-05 ENCOUNTER — IMMUNIZATION (OUTPATIENT)
Dept: NURSING | Facility: CLINIC | Age: 51
End: 2021-04-05
Payer: COMMERCIAL

## 2021-04-05 PROCEDURE — 0001A PR COVID VAC PFIZER DIL RECON 30 MCG/0.3 ML IM: CPT

## 2021-04-05 PROCEDURE — 91300 PR COVID VAC PFIZER DIL RECON 30 MCG/0.3 ML IM: CPT

## 2021-04-26 ENCOUNTER — IMMUNIZATION (OUTPATIENT)
Dept: NURSING | Facility: CLINIC | Age: 51
End: 2021-04-26
Attending: INTERNAL MEDICINE
Payer: COMMERCIAL

## 2021-04-26 PROCEDURE — 0002A PR COVID VAC PFIZER DIL RECON 30 MCG/0.3 ML IM: CPT

## 2021-04-26 PROCEDURE — 91300 PR COVID VAC PFIZER DIL RECON 30 MCG/0.3 ML IM: CPT

## 2021-09-18 ENCOUNTER — HEALTH MAINTENANCE LETTER (OUTPATIENT)
Age: 51
End: 2021-09-18

## 2021-11-18 ENCOUNTER — TRANSFERRED RECORDS (OUTPATIENT)
Dept: FAMILY MEDICINE | Facility: CLINIC | Age: 51
End: 2021-11-18

## 2022-02-10 ENCOUNTER — TRANSFERRED RECORDS (OUTPATIENT)
Dept: FAMILY MEDICINE | Facility: CLINIC | Age: 52
End: 2022-02-10

## 2022-04-30 ENCOUNTER — HEALTH MAINTENANCE LETTER (OUTPATIENT)
Age: 52
End: 2022-04-30

## 2022-06-16 ENCOUNTER — TRANSFERRED RECORDS (OUTPATIENT)
Dept: FAMILY MEDICINE | Facility: CLINIC | Age: 52
End: 2022-06-16

## 2022-07-26 ENCOUNTER — TRANSFERRED RECORDS (OUTPATIENT)
Dept: FAMILY MEDICINE | Facility: CLINIC | Age: 52
End: 2022-07-26

## 2022-08-20 ENCOUNTER — HEALTH MAINTENANCE LETTER (OUTPATIENT)
Age: 52
End: 2022-08-20

## 2022-09-08 LAB
HPV ABSTRACT: NORMAL
PAP-ABSTRACT: NORMAL

## 2022-09-09 LAB — TSH SERPL-ACNC: 0.65 MU/L (ref 0.45–4.5)

## 2022-09-16 ENCOUNTER — TRANSFERRED RECORDS (OUTPATIENT)
Dept: FAMILY MEDICINE | Facility: CLINIC | Age: 52
End: 2022-09-16

## 2022-10-04 ENCOUNTER — TRANSFERRED RECORDS (OUTPATIENT)
Dept: FAMILY MEDICINE | Facility: CLINIC | Age: 52
End: 2022-10-04

## 2022-11-17 ENCOUNTER — TRANSFERRED RECORDS (OUTPATIENT)
Dept: FAMILY MEDICINE | Facility: CLINIC | Age: 52
End: 2022-11-17

## 2022-11-20 ENCOUNTER — HEALTH MAINTENANCE LETTER (OUTPATIENT)
Age: 52
End: 2022-11-20

## 2022-12-27 ENCOUNTER — TRANSFERRED RECORDS (OUTPATIENT)
Dept: FAMILY MEDICINE | Facility: CLINIC | Age: 52
End: 2022-12-27

## 2023-03-03 ENCOUNTER — OFFICE VISIT (OUTPATIENT)
Dept: FAMILY MEDICINE | Facility: CLINIC | Age: 53
End: 2023-03-03

## 2023-03-03 VITALS
HEIGHT: 66 IN | DIASTOLIC BLOOD PRESSURE: 72 MMHG | HEART RATE: 68 BPM | SYSTOLIC BLOOD PRESSURE: 106 MMHG | BODY MASS INDEX: 35.36 KG/M2 | WEIGHT: 220 LBS | OXYGEN SATURATION: 97 %

## 2023-03-03 DIAGNOSIS — D48.5 NEOPLASM OF UNCERTAIN BEHAVIOR OF SKIN: Primary | ICD-10-CM

## 2023-03-03 PROCEDURE — 99213 OFFICE O/P EST LOW 20 MIN: CPT | Mod: 25 | Performed by: NURSE PRACTITIONER

## 2023-03-03 PROCEDURE — 11102 TANGNTL BX SKIN SINGLE LES: CPT | Performed by: NURSE PRACTITIONER

## 2023-03-03 RX ORDER — CITALOPRAM HYDROBROMIDE 20 MG/1
TABLET ORAL
COMMUNITY
Start: 2023-02-07 | End: 2024-08-14

## 2023-03-03 RX ORDER — BUPROPION HYDROCHLORIDE 300 MG/1
300 TABLET ORAL
COMMUNITY
Start: 2023-02-07

## 2023-03-03 RX ORDER — VITAMIN B COMPLEX
2000 TABLET ORAL
COMMUNITY

## 2023-03-03 RX ORDER — LEVOTHYROXINE SODIUM 150 MCG
1 TABLET ORAL
COMMUNITY
Start: 2022-10-05 | End: 2023-11-28

## 2023-03-03 RX ORDER — HYDROXYZINE PAMOATE 25 MG/1
CAPSULE ORAL
COMMUNITY
Start: 2022-11-04 | End: 2023-05-03

## 2023-03-03 NOTE — LETTER
[facility] [address] [place]  [address]  Phone: [phone]    March 10, 2023      Kourtney Rondon  [address] [facility] [address]              Dear Kourtney,     At [facility] our providers are working even more closely together. I am reviewing result from your visit with our provider Naomi Diaz while she is out of the clinic. The included test results from your recent visit are within normal ranges. I do not suggest that we make any changes at this time.     Wally Ma M.D.       Results for orders placed or performed in visit on 03/03/23   Pathology Report ([facility])     Status: None   Result Value Ref Range    . Comment     Clinician Provided ICD10 Comment     . Comment     . Comment     . Comment     . Comment     . Comment     Pathologist Provided ICD10 Comment     . Comment     Narrative    Performed at:  [address]  [id number]  : King Valdovinos MD, Phone:  [phone]    Specimen Comment: A duplicate report has been generated due to demographic updates.

## 2023-03-03 NOTE — PROGRESS NOTES
"Problem(s) Oriented visit        SUBJECTIVE:                                                    Kourtney Rondon is a 52 year old female who presents to clinic today for the following health issues :    Mole on left lower back has been there for years. Will get itchy area around it and then mole will get bigger.     Problem list, Medication list, Allergies, and Medical/Social/Surgical histories reviewed in [facility] and updated as appropriate.   Additional history: as documented    ROS:  5 point ROS completed and negative except noted above, including Gen, Skin    OBJECTIVE:                                                    /72   Pulse 68   Ht 1.676 m (5' 6\")   Wt 99.8 kg (220 lb)   SpO2 97%   BMI 35.51 kg/m    Body mass index is 35.51 kg/m .   GENERAL: healthy, alert and no distress  SKIN: 0.6 cm x 0.4 cm light brown macule left low back. See image below. (patient consent obtained for image to be used in chart)          ASSESSMENT/PLAN:                                                      Kourtney was seen today for derm problem and health maintenance.    Diagnoses and all orders for this visit:    Neoplasm of uncertain behavior of skin  -     VT TANGENTIAL BIOPSY OF SKIN, FIRST/SINGLE LESION  -     Pathology Report ([facility])      The procedure, risks and complications, which include but are not limited to bleeding, infection were discussed. Written consent was obtained for the procedure.   PROCEDURE:   Lesion was cleansed with alcohol wipe. 3 mL of  2% lidocaine with epinephrine used for subcutaneous anesthesia. Lesion was removed using Dermablade.The patient tolerated the procedure well.  Lesion was sent for pathology.    Follow up: The patient tolerated the procedure well without complications.  Standard post-procedure care is explained and return precautions are given.    See Patient Instructions  Patient Instructions   Clean and cover with vaseline/bacitracin and bandage for next 3 days.    Follow-up if any " fever, chills, redness around area or purulent drainage.    Take Tylenol or Ibuprofen as needed for pain      CARLITA Munson CNP  [facility]  Family Practice  [facility]  [phone]    For any issues my office # is [phone]

## 2023-03-03 NOTE — PATIENT INSTRUCTIONS
Clean and cover with vaseline/bacitracin and bandage for next 3 days.    Follow-up if any fever, chills, redness around area or purulent drainage.    Take Tylenol or Ibuprofen as needed for pain

## 2023-03-09 LAB
.: NORMAL
CLINICIAN PROVIDED ICD10: NORMAL
PATHOLOGIST PROVIDED ICD10: NORMAL

## 2023-03-10 NOTE — RESULT ENCOUNTER NOTE
Dear Kourtney,     At [facility] our providers are working even more closely together. I am reviewing result from your visit with our provider Naomi Diaz while she is out of the clinic. The included test results from your recent visit are within normal ranges. I do not suggest that we make any changes at this time.    Wally Ma M.D.

## 2023-03-13 ENCOUNTER — TRANSFERRED RECORDS (OUTPATIENT)
Dept: FAMILY MEDICINE | Facility: CLINIC | Age: 53
End: 2023-03-13

## 2023-05-03 ENCOUNTER — OFFICE VISIT (OUTPATIENT)
Dept: FAMILY MEDICINE | Facility: CLINIC | Age: 53
End: 2023-05-03

## 2023-05-03 VITALS
BODY MASS INDEX: 36.15 KG/M2 | HEART RATE: 96 BPM | WEIGHT: 224 LBS | RESPIRATION RATE: 16 BRPM | DIASTOLIC BLOOD PRESSURE: 78 MMHG | SYSTOLIC BLOOD PRESSURE: 130 MMHG | OXYGEN SATURATION: 96 %

## 2023-05-03 DIAGNOSIS — E03.9 ACQUIRED HYPOTHYROIDISM: ICD-10-CM

## 2023-05-03 DIAGNOSIS — N95.1 PERIMENOPAUSAL: Primary | ICD-10-CM

## 2023-05-03 PROCEDURE — 99213 OFFICE O/P EST LOW 20 MIN: CPT | Performed by: FAMILY MEDICINE

## 2023-05-03 ASSESSMENT — ANXIETY QUESTIONNAIRES
7. FEELING AFRAID AS IF SOMETHING AWFUL MIGHT HAPPEN: NOT AT ALL
IF YOU CHECKED OFF ANY PROBLEMS ON THIS QUESTIONNAIRE, HOW DIFFICULT HAVE THESE PROBLEMS MADE IT FOR YOU TO DO YOUR WORK, TAKE CARE OF THINGS AT HOME, OR GET ALONG WITH OTHER PEOPLE: SOMEWHAT DIFFICULT
6. BECOMING EASILY ANNOYED OR IRRITABLE: MORE THAN HALF THE DAYS
5. BEING SO RESTLESS THAT IT IS HARD TO SIT STILL: NOT AT ALL
GAD7 TOTAL SCORE: 6
3. WORRYING TOO MUCH ABOUT DIFFERENT THINGS: MORE THAN HALF THE DAYS
1. FEELING NERVOUS, ANXIOUS, OR ON EDGE: NOT AT ALL
2. NOT BEING ABLE TO STOP OR CONTROL WORRYING: SEVERAL DAYS
GAD7 TOTAL SCORE: 6

## 2023-05-03 ASSESSMENT — PATIENT HEALTH QUESTIONNAIRE - PHQ9
SUM OF ALL RESPONSES TO PHQ QUESTIONS 1-9: 12
5. POOR APPETITE OR OVEREATING: SEVERAL DAYS

## 2023-05-03 NOTE — PROGRESS NOTES
SUBJECTIVE:    Kourtney Rondon, is a 52 year old female with MDD, hypothyroidism, presenting for the below:     1. BMI 36 : interested in medical weight management. . Active with lots of walking and lifting. Was able to lose 30 lbs with walking over 18 months. Regained weight since knee replacement. Has goal of 30-40 lbs weight loss. Often feels bloated. FH of lipedema (in sister). Menopausal symptoms : affecting mood (follows with psychiatry).      OBJECTIVE:  Vitals:    05/03/23 1222   BP: 130/78   Pulse: 96   Resp: 16   SpO2: 96%   Weight: 101.6 kg (224 lb)    Body mass index is 36.15 kg/m .    General: no acute distress, cooperative with exam.  Psych: mental status normal, mood and affect appropriate.    PHQ 9 : 12   MARÍA 7 : 6    ASSESSMENT / PLAN:      Perimenopausal  BMI 36.0-36.9,adult  Acquired hypothyroidism  Offered medical weight management. Issued with intake form to complete. Will return with this for dedicated medical weight management first appointment.

## 2023-05-24 ENCOUNTER — OFFICE VISIT (OUTPATIENT)
Dept: FAMILY MEDICINE | Facility: CLINIC | Age: 53
End: 2023-05-24

## 2023-05-24 VITALS
SYSTOLIC BLOOD PRESSURE: 116 MMHG | OXYGEN SATURATION: 97 % | HEART RATE: 78 BPM | DIASTOLIC BLOOD PRESSURE: 82 MMHG | WEIGHT: 221 LBS | BODY MASS INDEX: 35.67 KG/M2

## 2023-05-24 DIAGNOSIS — K21.9 GASTROESOPHAGEAL REFLUX DISEASE WITHOUT ESOPHAGITIS: Primary | ICD-10-CM

## 2023-05-24 DIAGNOSIS — E66.01 MORBID OBESITY (H): ICD-10-CM

## 2023-05-24 DIAGNOSIS — E03.9 ACQUIRED HYPOTHYROIDISM: ICD-10-CM

## 2023-05-24 PROCEDURE — 99214 OFFICE O/P EST MOD 30 MIN: CPT | Performed by: FAMILY MEDICINE

## 2023-05-24 NOTE — PROGRESS NOTES
Primary Care Weight Management                                                          INITIAL ASSESSMENT      HPI  Kourtney Rondon is a 52 year old female being seen today for metabolic health and weight management assessment with the following weight related health issues : GERD      Eating behaviors:   Binging: absent  Grazing:present  Eating after dinner:present  Waking at night to eat:absent  Emotional eating: present  Carbohydrate craving: present  Experiencing constant hunger: present      Feeling out of control when eating: No  Eating until uncomfortably full: No  Eating large amounts when not physically hungry : No    Past psychological treatment:  Inpatient treatment:  Yes  Outpatient treatment:  Yes  Hx of substance abuse: H/o of cocaine addiction. 21 years sober.       PHYSICAL EXAMINATION:  Vitals: /82   Pulse 78   Wt 100.2 kg (221 lb)   SpO2 97%   BMI 35.67 kg/m    General: no acute distress, cooperative with exam.  Psych: mental status normal, mood and affect appropriate.    ASSESSMENT/PLAN:     Gastroesophageal reflux disease without esophagitis  BMI 35.0-35.9,adult  Acquired hypothyroidism  Morbid obesity (H)  -Agreed together to treat obesity-associated medical conditions and conditions exacerbated by or contributing to weight gain by medical management of weight:  -Discussed the need to address all four pillars of medical weight management (nutrition, physical activity, behavior and medication) going forward for best chances of success in sustained weight loss  -Discussed obesity as being a chronic, relapsing, multifactorial condition with a neurohormonal basis and that medical management should be though of as a long term treatment.  -Managed expectations of weight loss and discussed aiming for sustained loss.  -An understanding of the necessity for commitment to life-long follow-up, lifestyle changes and dietary modification required for long-term success was also  verbalized.    Medications currently taking with the potential effect of weight gain : celexa  Medications currently taking with the potential effect of weight loss : wellbutrin  Potential medications for the purposes of weight loss:   Phentermine (no uncontrolled HTN, arrhythmias)  Metformin  Bupropion (no Hx of seizures, anorexia)   Naltrexone (no suicidal ideation, not taking opiates)  GLP -1 receptor agonists (no FHx/personal Hx of MEN, thyroid cancer. No Hx of pancreatitis).   Would not recommended these medications for the purposes of weight loss:  Topiramate (remote h/o kideny stones. , feels has brain fog at baseline)    After discussion would like to try GLP 1. Mechanism of action, common side effects and how to take discussed.       -     Semaglutide-Weight Management (WEGOVY) 0.25 MG/0.5ML pen; Inject 0.25 mg Subcutaneous once a week    Discussed and issued on AVS:  I know I don't need it, but something keeps pushing me to do it.   I know there are a lot of things I should be doing, but my body will not follow.   As soon as I sit down everything is over : ie snacking.   You are aware of grazing throughout the day : will continue to food journal : finding this improves mindfulness.         Follow up with me in 1 month

## 2023-05-24 NOTE — PATIENT INSTRUCTIONS
I know I don't need it, but something keeps pushing me to do it.     I know there are a lot of things I should be doing, but my body will not follow.     As soon as I sit down everything is over : ie snacking.     You are aware of grazing throughout the day.

## 2023-05-25 ENCOUNTER — TELEPHONE (OUTPATIENT)
Dept: FAMILY MEDICINE | Facility: CLINIC | Age: 53
End: 2023-05-25

## 2023-05-25 NOTE — TELEPHONE ENCOUNTER
Wegovy prior authorization approved effective 4/24/23-11/20/23.   Patient and pharmacy informed.  Per pharmacy, cost to patient is $24.99.  Tammi Longoria RN

## 2023-06-06 DIAGNOSIS — E66.01 MORBID OBESITY (H): ICD-10-CM

## 2023-06-06 DIAGNOSIS — K21.9 GASTROESOPHAGEAL REFLUX DISEASE WITHOUT ESOPHAGITIS: ICD-10-CM

## 2023-06-06 NOTE — TELEPHONE ENCOUNTER
wegovy--patient will use hard copy to see if can find at a pharmacy.  If she can not find anywhere, she will call back and schedule an appointment to discuss options.    Jaosn Knowles,   [facility]  [phone]

## 2023-06-08 DIAGNOSIS — K21.9 GASTROESOPHAGEAL REFLUX DISEASE WITHOUT ESOPHAGITIS: ICD-10-CM

## 2023-06-08 DIAGNOSIS — E66.01 MORBID OBESITY (H): ICD-10-CM

## 2023-06-08 NOTE — TELEPHONE ENCOUNTER
Court. LOV 5/24/23.    After discussion would like to try GLP 1. Mechanism of action, common side effects and how to take discussed.         -     Semaglutide-Weight Management (WEGOVY) 0.25 MG/0.5ML pen; Inject 0.25 mg Subcutaneous once a week

## 2023-06-29 ENCOUNTER — OFFICE VISIT (OUTPATIENT)
Dept: FAMILY MEDICINE | Facility: CLINIC | Age: 53
End: 2023-06-29

## 2023-06-29 VITALS
WEIGHT: 217 LBS | SYSTOLIC BLOOD PRESSURE: 116 MMHG | HEART RATE: 66 BPM | BODY MASS INDEX: 35.02 KG/M2 | DIASTOLIC BLOOD PRESSURE: 76 MMHG | OXYGEN SATURATION: 97 %

## 2023-06-29 DIAGNOSIS — K21.9 GASTROESOPHAGEAL REFLUX DISEASE WITHOUT ESOPHAGITIS: Primary | ICD-10-CM

## 2023-06-29 DIAGNOSIS — E66.01 MORBID OBESITY (H): ICD-10-CM

## 2023-06-29 PROCEDURE — 99214 OFFICE O/P EST MOD 30 MIN: CPT | Performed by: FAMILY MEDICINE

## 2023-06-29 NOTE — PROGRESS NOTES
"PRIMARY CARE WEIGHT MANAGEMENT PROGRESS NOTE    CHIEF COMPLAINT:  Kourtney Rondon is a 52 year old female who is following up for medical weight management.     Medications:   Wegovy 0.25 mg weekly. Good satiety.  Has taken for 3 weeks now. Noticing a wearing off effect towards end of week.     Nutrition:  Bruch : salad, granola bar, fruit  Dinner : pasta, sauce, broccoli.  cooks : minimal red meat as he has heart disease. Eats lots of fresh veg and chicken.   No longer drinking pop and no longer drinking starbucks    Behavior:   \"I gotta keep myself in check\" :     Physical Activity:    : works in heat all day.   Has a stair stepper at home.     Past psychological treatment:  Inpatient treatment:  Yes  Outpatient treatment:  Yes  Hx of substance abuse: H/o of cocaine addiction. 21 years sober.     WEIGHT HISTORY:   Wt Readings from Last 3 Encounters:   06/29/23 98.4 kg (217 lb)   05/24/23 100.2 kg (221 lb)   05/03/23 101.6 kg (224 lb)       PHYSICAL EXAM:  VITAL SIGNS:  /76   Pulse 66   Wt 98.4 kg (217 lb)   LMP 05/23/2023 (Approximate)   SpO2 97%   BMI 35.02 kg/m      Weight at start of treatment: 221 lb   Weight today: 217 lb  Weight change since last appointment: -4 lbs  Weight change since start of treatment : -4 lbs  Goal weight loss over first 6 months: 22 lbs (-10 5 totally body weight)    General: no acute distress, cooperative with exam.  HEENT:  PERRLA. Bilateral TM's, external canals, oropharynx normal.    Lungs: clear to auscultation bilaterally, normal respiratory effort.  Heart:  RRR without murmurs, rubs or gallops.  Normal S1 and S2.  Extremities: warm, perfused, no swelling or edema.    Psych: mental status normal, mood and affect appropriate.      ASSESSMENT/PLAN:  Kourtney Rondon is a 52 year old female who is following up for medical weight management.    Gastroesophageal reflux disease without esophagitis  Morbid obesity (H)  BMI 35.0-35.9,adult    -     " Semaglutide-Weight Management (WEGOVY) 0.5 MG/0.5ML pen; Inject 0.5 mg Subcutaneous once a week    Plan for management includes the following:    -Nutrition: increase protein in diet   -Exercise: discussed exercise prescription   -Medications: titrate to next dose of Wegovy (0.5 mg weekly). Consider switching to Saxsenda daily dosing if week wearing off effect becomes difficult.              Discussed and issued on AVS:      YOUR EXERCISE PRESCRIPTION    Frequency: 3 times per week. Tuesday, Thursday Saturday.   Intensity: 11  Time: 5 minutes  Type: Stair stepper  Enjoyment: the quite and feeling of achievement when you have done it.     We should all move every day  Some movement is better than none.     rotein and weight loss    Aim for :  80 -100 grams of protein per day  20-30 grams per meal  5-10 grams for a snack      Protein is the macronutrient that suppresses appetite the most.     You do not like cooking and prefer 'quick, on the fly' foods.     Follow up with me: 1 month

## 2023-06-29 NOTE — PATIENT INSTRUCTIONS
Protein and weight loss    Aim for :  80 -100 grams of protein per day  20-30 grams per meal  5-10 grams for a snack      Protein is the macronutrient that suppresses appetite the most.     You do not like cooking and prefer 'quick, on the fly' foods.               You would like to build muscle and ton your legs.   You have use the stair stepper before and you know it works for you.       YOUR EXERCISE PRESCRIPTION    Frequency: 3 times per week. Tuesday, Thursday Saturday.   Intensity: 11  Time: 5 minutes  Type: Stair stepper  Enjoyment: the quite and feeling of achievement when you have done it.     We should all move every day  Some movement is better than none.

## 2023-07-27 ENCOUNTER — OFFICE VISIT (OUTPATIENT)
Dept: FAMILY MEDICINE | Facility: CLINIC | Age: 53
End: 2023-07-27

## 2023-07-27 VITALS
WEIGHT: 212.2 LBS | HEART RATE: 76 BPM | BODY MASS INDEX: 34.25 KG/M2 | SYSTOLIC BLOOD PRESSURE: 121 MMHG | DIASTOLIC BLOOD PRESSURE: 86 MMHG | OXYGEN SATURATION: 96 %

## 2023-07-27 DIAGNOSIS — Z98.890 S/P DECOMPRESSION OF ULNAR NERVE AT ELBOW: ICD-10-CM

## 2023-07-27 DIAGNOSIS — E66.01 MORBID OBESITY (H): ICD-10-CM

## 2023-07-27 DIAGNOSIS — K21.9 GASTROESOPHAGEAL REFLUX DISEASE WITHOUT ESOPHAGITIS: Primary | ICD-10-CM

## 2023-07-27 PROCEDURE — 99214 OFFICE O/P EST MOD 30 MIN: CPT | Performed by: FAMILY MEDICINE

## 2023-07-27 NOTE — PROGRESS NOTES
"PRIMARY CARE WEIGHT MANAGEMENT PROGRESS NOTE    CHIEF COMPLAINT:  Kourtney Rondon is a 52 year old female who is following up for medical weight management.     Medications:   Wegovy 0.5 mg weekly. Good satiety.  Noticing a wearing off effect towards end of week.     Nutrition:  Has increase protein in nutrition.     Behavior:   \"I gotta keep myself in check\" :     Physical Activity:    : works in heat all day.   Has a stair stepper at home : using this more .    Past psychological treatment:  Inpatient treatment:  Yes  Outpatient treatment:  Yes  Hx of substance abuse: H/o of cocaine addiction. 21 years sober.       Right arm symptoms :S/p right ulnar nerve surgery and tendon release surgery March 2022. . Symptoms flaring. Is unable to get in contact with [employer] for work letter (to be excused today and tomorrow in order to rest arm). Requests this from PCP today. Will be scheduling appointment with [employer] soon.    WEIGHT HISTORY:   Wt Readings from Last 3 Encounters:   07/27/23 96.3 kg (212 lb 3.2 oz)   06/29/23 98.4 kg (217 lb)   05/24/23 100.2 kg (221 lb)       PHYSICAL EXAM:  VITAL SIGNS:  /86   Pulse 76   Wt 96.3 kg (212 lb 3.2 oz)   LMP 05/23/2023 (Approximate)   SpO2 96%   BMI 34.25 kg/m      Weight at start of treatment: 221 lb   Weight last appointment: 217 lb  Weight today: 212 lb  Weight change since last appointment: -5 lbs  Weight change since start of treatment : -9 lbs  Goal weight loss over first 6 months: 22 lbs (-10% totally body weight)    General: no acute distress, cooperative with exam.  Psych: mental status normal, mood and affect appropriate.          ASSESSMENT/PLAN:  Kourtney Rondon is a 52 year old female who is following up for medical weight management.    Gastroesophageal reflux disease without esophagitis  Morbid obesity (H)  BMI 34.0-34.9,adult    Plan for management includes the following:    -Nutrition: continue with increase protein in diet   -Exercise:  " encouraged to continue incorporating strength training.    -Medications: titrate to next dose of Wegovy (1 mg weekly). Consider switching to Saxsenda daily dosing if week wearing off effect becomes difficult.     -Body composition analysis completed today. Spent time discussing and interpreting reading. High functional skeletal muscle mass (likely related to physical job).   Discussed importance of additional strength training to counteract muscle loss with GLP1.   -consider repeating composition analysis no sooner than 3 months time.     -     HI MARIMAR WHOLE BODY COMPOSITION ASSESSMENT W/I&R  -     Semaglutide-Weight Management (WEGOVY) 1 MG/0.5ML pen; Inject 1 mg Subcutaneous once a week for 90 days    S/P decompression of ulnar nerve at elbow  Issued with work letter (to be excused today and tomorrow in order to rest arm). Will be scheduling appointment with [employer] soon.    Follow up with me: 4 to 6 weeks.

## 2023-07-27 NOTE — LETTER
July 27, 2023      Kourtney Rondon  [address] [facility] [address]        To Whom It May Concern:    Kourtney Rondon was seen in our clinic. She is unable to work today and tomorrow.     She may return to work with full duties 7/29/2023      Sincerely,        Hilary Abraham MD

## 2023-08-30 ENCOUNTER — OFFICE VISIT (OUTPATIENT)
Dept: FAMILY MEDICINE | Facility: CLINIC | Age: 53
End: 2023-08-30

## 2023-08-30 VITALS
DIASTOLIC BLOOD PRESSURE: 76 MMHG | OXYGEN SATURATION: 99 % | WEIGHT: 208.8 LBS | SYSTOLIC BLOOD PRESSURE: 111 MMHG | BODY MASS INDEX: 33.7 KG/M2 | HEART RATE: 83 BPM

## 2023-08-30 DIAGNOSIS — Z86.39 HISTORY OF MORBID OBESITY: ICD-10-CM

## 2023-08-30 DIAGNOSIS — K21.9 GASTROESOPHAGEAL REFLUX DISEASE WITHOUT ESOPHAGITIS: Primary | ICD-10-CM

## 2023-08-30 PROCEDURE — 99214 OFFICE O/P EST MOD 30 MIN: CPT | Performed by: FAMILY MEDICINE

## 2023-08-30 NOTE — PROGRESS NOTES
"PRIMARY CARE WEIGHT MANAGEMENT PROGRESS NOTE    CHIEF COMPLAINT:  Kourtney Rondon is a 52 year old female who is following up for medical weight management.     Medications:   Wegovy 1 mg weekly. Good satiety.  Noticing a wearing off effect towards end of week.     Nutrition:  Continues with increase protein.    Behavior:   \"I gotta keep myself in check\" :     Physical Activity:    : good muscle mass on body composition analysis.   Has a stair stepper at home : using this more .    Past psychological treatment:  Inpatient treatment:  Yes  Outpatient treatment:  Yes  Hx of substance abuse: H/o of cocaine addiction. 21 years sober.         WEIGHT HISTORY:   Wt Readings from Last 3 Encounters:   08/30/23 94.7 kg (208 lb 12.8 oz)   07/27/23 96.3 kg (212 lb 3.2 oz)   06/29/23 98.4 kg (217 lb)       PHYSICAL EXAM:  VITAL SIGNS:  /76   Pulse 83   Wt 94.7 kg (208 lb 12.8 oz)   SpO2 99%   BMI 33.70 kg/m      Weight at start of treatment: 221 lb   Weight last appointment: 212  lb  Weight today: 208 lb  Weight change since last appointment: -4 lbs  Weight change since start of treatment : -13 lbs  Goal weight loss over first 6 months: 22 lbs (-10% totally body weight)    General: no acute distress, cooperative with exam.  Psych: mental status normal, mood and affect appropriate.      ASSESSMENT/PLAN:  Kourtney Rondon is a 52 year old female who is following up for medical weight management.      Gastroesophageal reflux disease without esophagitis  BMI 33.0-33.9,adult  History of morbid obesity  -     Semaglutide-Weight Management (WEGOVY) 1.7 MG/0.75ML pen; Inject 1.7 mg Subcutaneous once a week    Plan for management includes the following:    -Nutrition: continue with increase protein in diet   -Exercise:  encouraged to continue incorporating strength training.    -Medications: titrate to next dose of Wegovy (1.7 mg weekly). Consider switching to Saxsenda daily dosing if week wearing off effect becomes " difficult.     Discussed importance of additional strength training to counteract muscle loss with GLP1.   -consider repeating composition analysis no sooner than 3 months time.       Follow up with me: 4 to 6 weeks.

## 2023-09-19 ENCOUNTER — OFFICE VISIT (OUTPATIENT)
Dept: FAMILY MEDICINE | Facility: CLINIC | Age: 53
End: 2023-09-19

## 2023-09-19 VITALS
SYSTOLIC BLOOD PRESSURE: 112 MMHG | DIASTOLIC BLOOD PRESSURE: 78 MMHG | OXYGEN SATURATION: 98 % | WEIGHT: 204 LBS | HEART RATE: 82 BPM | BODY MASS INDEX: 32.93 KG/M2

## 2023-09-19 DIAGNOSIS — E03.9 ACQUIRED HYPOTHYROIDISM: ICD-10-CM

## 2023-09-19 DIAGNOSIS — K21.9 GASTROESOPHAGEAL REFLUX DISEASE WITHOUT ESOPHAGITIS: Primary | ICD-10-CM

## 2023-09-19 DIAGNOSIS — Z86.39 HISTORY OF MORBID OBESITY: ICD-10-CM

## 2023-09-19 PROCEDURE — 99214 OFFICE O/P EST MOD 30 MIN: CPT | Performed by: FAMILY MEDICINE

## 2023-09-19 NOTE — PROGRESS NOTES
"PRIMARY CARE WEIGHT MANAGEMENT PROGRESS NOTE    CHIEF COMPLAINT:  Kourtney Rondon is a 52 year old female who is following up for medical weight management.     Medications:   Wegovy 1.7 mg weekly (titrated up at last appointment). Good satiety. Some nausea sensation. Feels correct dose.     Nutrition:  Has increased protein, but has been veering away from this now and needs to increase again. Is adhering to smaller portions. Drinking lots of water.     Behavior:   \"I gotta keep myself in check\" :   Down 2 pant sizes. Shirts no longer tight.     Physical Activity:    : good muscle mass on body composition analysis.   Has a stair stepper at home : using this more and incorporating this into a form of stress management.     Past psychological treatment:  Inpatient treatment:  Yes  Outpatient treatment:  Yes  Hx of substance abuse: H/o of cocaine addiction. 21 years sober.     WEIGHT HISTORY:   Wt Readings from Last 3 Encounters:   09/19/23 92.5 kg (204 lb)   08/30/23 94.7 kg (208 lb 12.8 oz)   07/27/23 96.3 kg (212 lb 3.2 oz)     PHYSICAL EXAM:  VITAL SIGNS:  /78   Pulse 82   Wt 92.5 kg (204 lb)   SpO2 98%   BMI 32.93 kg/m      Weight at start of treatment: 221 lb   Weight last appointment: 208  lb  Weight today: 204 lb (201 lbs on home scale)  Weight change since last appointment: -4 lbs  Weight change since start of treatment : -17 lbs  Goal weight loss over first 6 months: 22 lbs (-10% totally body weight)    General: no acute distress, cooperative with exam.  Psych: mental status normal, mood and affect appropriate.      ASSESSMENT/PLAN:  Kourtney Rondon is a 52 year old female who is following up for medical weight management.      Gastroesophageal reflux disease without esophagitis  BMI 33.0-33.9,adult  History of morbid obesity    -     Semaglutide-Weight Management (WEGOVY) 1.7 MG/0.75ML pen; Inject 1.7 mg Subcutaneous once a week    Plan for management includes the following: "    -Nutrition: continue with increase protein in diet   -Exercise:  encouraged to continue incorporating strength training.    -Medications: continue current dose Wegovy (1.7 mg weekly).   Discussed importance of additional strength training to counteract muscle loss with GLP1.   -consider repeating composition analysis no sooner than 3 months time.     Acquired hypothyroidism   Plans to have TSH check at annual OB appointment next week.       Follow up with me: 4 to 6 weeks.

## 2023-09-21 ENCOUNTER — TRANSFERRED RECORDS (OUTPATIENT)
Dept: FAMILY MEDICINE | Facility: CLINIC | Age: 53
End: 2023-09-21

## 2023-10-16 DIAGNOSIS — K21.9 GASTROESOPHAGEAL REFLUX DISEASE WITHOUT ESOPHAGITIS: ICD-10-CM

## 2023-10-16 DIAGNOSIS — Z86.39 HISTORY OF MORBID OBESITY: ICD-10-CM

## 2023-10-16 NOTE — TELEPHONE ENCOUNTER
Med: wegovy 6 month supply please     LOV (related): 9/19/23      Due for F/U around: 4-6 weeks    Next Appt: 10/25/23 with Jarad           Wt Readings from Last 2 Encounters:   09/19/23 92.5 kg (204 lb)   08/30/23 94.7 kg (208 lb 12.8 oz)       BMI Readings from Last 2 Encounters:   09/19/23 32.93 kg/m    08/30/23 33.70 kg/m

## 2023-11-17 DIAGNOSIS — Z86.39 HISTORY OF MORBID OBESITY: ICD-10-CM

## 2023-11-17 DIAGNOSIS — K21.9 GASTROESOPHAGEAL REFLUX DISEASE WITHOUT ESOPHAGITIS: ICD-10-CM

## 2023-11-17 NOTE — TELEPHONE ENCOUNTER
Patient calls clinic to request increase in Wegovy dose.  Currently on 1.7 mg weekly and would like to increase to 2.4 mg.    Last related OV: 9/19/23    Next Appt: 11/28/23    Wt Readings from Last 2 Encounters:   09/19/23 92.5 kg (204 lb)   08/30/23 94.7 kg (208 lb 12.8 oz)     Routing to Hilary Abraham MD for authorization.  Penelope Chowdhury LPN on 11/17/2023 at 3:35 PM

## 2023-11-19 ENCOUNTER — HEALTH MAINTENANCE LETTER (OUTPATIENT)
Age: 53
End: 2023-11-19

## 2023-11-28 ENCOUNTER — OFFICE VISIT (OUTPATIENT)
Dept: FAMILY MEDICINE | Facility: CLINIC | Age: 53
End: 2023-11-28

## 2023-11-28 VITALS
HEART RATE: 73 BPM | HEIGHT: 65 IN | DIASTOLIC BLOOD PRESSURE: 81 MMHG | OXYGEN SATURATION: 98 % | WEIGHT: 187.3 LBS | SYSTOLIC BLOOD PRESSURE: 121 MMHG | BODY MASS INDEX: 31.21 KG/M2

## 2023-11-28 DIAGNOSIS — E03.9 ACQUIRED HYPOTHYROIDISM: ICD-10-CM

## 2023-11-28 DIAGNOSIS — K21.9 GASTROESOPHAGEAL REFLUX DISEASE WITHOUT ESOPHAGITIS: Primary | ICD-10-CM

## 2023-11-28 DIAGNOSIS — E66.811 CLASS 1 OBESITY WITH BODY MASS INDEX (BMI) OF 30.0 TO 30.9 IN ADULT, UNSPECIFIED OBESITY TYPE, UNSPECIFIED WHETHER SERIOUS COMORBIDITY PRESENT: ICD-10-CM

## 2023-11-28 DIAGNOSIS — Z86.39 HISTORY OF MORBID OBESITY: ICD-10-CM

## 2023-11-28 PROBLEM — E66.01 MORBID OBESITY (H): Status: RESOLVED | Noted: 2023-05-24 | Resolved: 2023-11-28

## 2023-11-28 PROCEDURE — 99214 OFFICE O/P EST MOD 30 MIN: CPT | Performed by: FAMILY MEDICINE

## 2023-11-28 RX ORDER — LEVOTHYROXINE SODIUM 125 UG/1
1 TABLET ORAL
COMMUNITY
Start: 2023-11-03 | End: 2024-02-06 | Stop reason: DRUGHIGH

## 2023-11-28 NOTE — PROGRESS NOTES
"PRIMARY CARE WEIGHT MANAGEMENT PROGRESS NOTE    CHIEF COMPLAINT:  Kourtney Rondon is a 52 year old female who is following up for medical weight management.     Medications:   Wegovy weekly (titrated up at last appointment). Good satiety. Some nausea sensation.     Nutrition:   Is adhering to smaller portions. Drinking lots of water.     Behavior:   \"I gotta keep myself in check\" :   I'll be really good for a while then I'll revert back.     Physical Activity:    : good muscle mass on body composition analysis.   Has a stair stepper at home : using this more and incorporating this into a form of stress management.     Past psychological treatment:  Inpatient treatment:  Yes  Outpatient treatment:  Yes  Hx of substance abuse: H/o of cocaine addiction. 21 years sober.     WEIGHT HISTORY:   Wt Readings from Last 3 Encounters:   11/28/23 85 kg (187 lb 4.8 oz)   09/19/23 92.5 kg (204 lb)   08/30/23 94.7 kg (208 lb 12.8 oz)     PHYSICAL EXAM:  VITAL SIGNS:  /81   Pulse 73   Ht 1.657 m (5' 5.25\")   Wt 85 kg (187 lb 4.8 oz)   SpO2 98%   BMI 30.93 kg/m      Weight at start of treatment: 221 lb   Weight last appointment: 204  lb  Weight today: 187 lb  Weight change since last appointment: - 17 lbs  Weight change since start of treatment : -34 lbs  Goal weight loss over first 6 months: - 22 lbs (-10% totally body weight) achieved.     General: no acute distress, cooperative with exam.  Psych: mental status normal, mood and affect appropriate.      ASSESSMENT/PLAN:  Kourtney Rondon is a 52 year old female who is following up for medical weight management.    Gastroesophageal reflux disease without esophagitis  Class 1 obesity with body mass index (BMI) of 30.0 to 30.9 in adult, unspecified obesity type, unspecified whether serious comorbidity present  History of morbid obesity    -     Semaglutide-Weight Management (WEGOVY) 2.4 MG/0.75ML pen; Inject 1.7 mg Subcutaneous once a week    Plan for management " includes the following:    -Nutrition: continue with increase protein in diet   -Exercise:  encouraged to continue incorporating strength training.    -Medications: Wegovy increase to 2.4 mg next week   Discussed importance of additional strength training to counteract muscle loss with GLP1.   -consider repeating composition analysis no sooner than 3 months time.     Acquired hypothyroidism   Working with OB : currently under replaced .      Follow up with me: 3 months.

## 2023-12-11 DIAGNOSIS — Z86.39 HISTORY OF MORBID OBESITY: ICD-10-CM

## 2023-12-11 DIAGNOSIS — K21.9 GASTROESOPHAGEAL REFLUX DISEASE WITHOUT ESOPHAGITIS: ICD-10-CM

## 2023-12-11 NOTE — TELEPHONE ENCOUNTER
Prior authorization done via [employer] for Wegovy. Patient had prior authorization previously approved. Prior authorization renewed with approval dates 11/11/23-12/10/24. Leia Asher

## 2023-12-11 NOTE — TELEPHONE ENCOUNTER
Med: Wegovy 2.4mg    LOV (related): 11/28/23 with Dr. Abraham       Due for F/U around: 3 months- around 2/2024    Next Appt: 2/6/2024 with Dr. Abraham       Wt Readings from Last 2 Encounters:   11/28/23 85 kg (187 lb 4.8 oz)   09/19/23 92.5 kg (204 lb)     BMI Readings from Last 2 Encounters:   11/28/23 30.93 kg/m    09/19/23 32.93 kg/m

## 2024-01-10 DIAGNOSIS — Z86.39 HISTORY OF MORBID OBESITY: ICD-10-CM

## 2024-01-10 DIAGNOSIS — K21.9 GASTROESOPHAGEAL REFLUX DISEASE WITHOUT ESOPHAGITIS: ICD-10-CM

## 2024-01-10 NOTE — TELEPHONE ENCOUNTER
Med: caleb AMOS (related): 11/28/23      Due for F/U around: 3 months    Next Appt: 2/6/24 with Jarad           Wt Readings from Last 2 Encounters:   11/28/23 85 kg (187 lb 4.8 oz)   09/19/23 92.5 kg (204 lb)       BMI Readings from Last 2 Encounters:   11/28/23 30.93 kg/m    09/19/23 32.93 kg/m

## 2024-01-25 ENCOUNTER — TRANSFERRED RECORDS (OUTPATIENT)
Dept: FAMILY MEDICINE | Facility: CLINIC | Age: 54
End: 2024-01-25

## 2024-02-06 ENCOUNTER — OFFICE VISIT (OUTPATIENT)
Dept: FAMILY MEDICINE | Facility: CLINIC | Age: 54
End: 2024-02-06

## 2024-02-06 VITALS
BODY MASS INDEX: 29.6 KG/M2 | HEIGHT: 66 IN | SYSTOLIC BLOOD PRESSURE: 119 MMHG | HEART RATE: 71 BPM | DIASTOLIC BLOOD PRESSURE: 79 MMHG | WEIGHT: 184.2 LBS | OXYGEN SATURATION: 100 %

## 2024-02-06 DIAGNOSIS — Z86.39 HISTORY OF MORBID OBESITY: ICD-10-CM

## 2024-02-06 PROCEDURE — 99214 OFFICE O/P EST MOD 30 MIN: CPT | Performed by: FAMILY MEDICINE

## 2024-02-06 RX ORDER — LEVOTHYROXINE SODIUM 100 UG/1
1 TABLET ORAL
COMMUNITY
Start: 2024-01-31

## 2024-02-06 NOTE — PROGRESS NOTES
"PRIMARY CARE WEIGHT MANAGEMENT PROGRESS NOTE    CHIEF COMPLAINT:  Kourtney Rondon is a 52 year old female who is following up for medical weight management.     Medications:   Wegovy 2.4 mg weekly (titrated up at last appointment). Good satiety. Some nausea sensation.     Nutrition:  Stressed and \"dealing with a lot of crap\".     Behavior:   \"Stinking thinking\".     Physical Activity:    : good muscle mass on body composition analysis.   No longer using stair stepper.     Past psychological treatment:  Inpatient treatment:  Yes  Outpatient treatment:  Yes  Hx of substance abuse: H/o of cocaine addiction. 21 years sober.       WEIGHT HISTORY:   Wt Readings from Last 3 Encounters:   02/06/24 83.6 kg (184 lb 3.2 oz)   11/28/23 85 kg (187 lb 4.8 oz)   09/19/23 92.5 kg (204 lb)     PHYSICAL EXAM:  VITAL SIGNS:  /79   Pulse 71   Ht 1.676 m (5' 6\")   Wt 83.6 kg (184 lb 3.2 oz)   SpO2 100%   BMI 29.73 kg/m      Weight at start of treatment: 221 lb   Weight last appointment: 187  lb  Weight today: 184 lb  Weight change since last appointment: - 3 lbs  Weight change since start of treatment : -37 lbs  Goal weight loss over first 6 months: - 22 lbs (-10% totally body weight) achieved.     General: no acute distress, cooperative with exam.  Psych: mental status normal, mood and affect appropriate.      ASSESSMENT/PLAN:  Kourtney Rondon is a 52 year old female who is following up for medical weight management.    Class 1 obesity with body mass index (BMI) of 30.0 to 30.9 in adult, unspecified obesity type, unspecified whether serious comorbidity present  History of morbid obesity    -     Semaglutide-Weight Management (WEGOVY) 2.4 MG/0.75ML pen; Inject 1.7 mg Subcutaneous once a week    Plan for management includes the following:    -Nutrition: continue with increase protein in diet   -Exercise:  encouraged to continue incorporating strength training.    -Medications: Wegovy 2.4 mg weekly   Discussed " "importance of additional strength training to counteract muscle loss with GLP1.     -consider repeating composition analysis no sooner than 3 months time.     Discussed and issued on AVS:  \"I'm starting to feel saggy\". You are perimenopausal.     \"I feel like I'm losing some of my muscle mass\".     'It's like I want it but I'm really lazy about it\".    \"Its hard to get out of that thinking\" \  -your gotten out of this in the past with baby steps.   -do you need to feel the pain of it to make change or could you start making small change prior to that point as a part of your evolution.     Look at strength training. Beginners you tube videos for free. Combating muscle mass loss IS in your control.       Acquired hypothyroidism   Recent decrease in synthroid dose. Has thyroid dosing managed by OBGyn.       Follow up with me: 2 to 3 months.   "

## 2024-02-06 NOTE — PATIENT INSTRUCTIONS
"\"I know the tools to do it. I'm just taking baby steps to get back into it\".     \"I'm starting to feel saggy\". You are perimenopausal.     \"I feel like I'm losing some of my muscle mass\".     'It's like I want it but I'm really lazy about it\".    \"Its hard to get out of that thinking\" \  -your gotten out of this in the past with baby steps.   -do you need to feel the pain of it to make change or could you start making small change prior to that point as a part of your evolution.     Look at strength training. Beginners you tube videos for free. Combating muscle mass loss IS in your control.           "

## 2024-04-26 ENCOUNTER — OFFICE VISIT (OUTPATIENT)
Dept: FAMILY MEDICINE | Facility: CLINIC | Age: 54
End: 2024-04-26

## 2024-04-26 VITALS
HEART RATE: 71 BPM | HEIGHT: 65 IN | SYSTOLIC BLOOD PRESSURE: 107 MMHG | DIASTOLIC BLOOD PRESSURE: 76 MMHG | BODY MASS INDEX: 29.46 KG/M2 | WEIGHT: 176.8 LBS | OXYGEN SATURATION: 99 %

## 2024-04-26 DIAGNOSIS — K21.9 GASTROESOPHAGEAL REFLUX DISEASE WITHOUT ESOPHAGITIS: ICD-10-CM

## 2024-04-26 DIAGNOSIS — Z86.39 HISTORY OF MORBID OBESITY: Primary | ICD-10-CM

## 2024-04-26 DIAGNOSIS — E66.3 OVERWEIGHT (BMI 25.0-29.9): ICD-10-CM

## 2024-04-26 PROCEDURE — 99214 OFFICE O/P EST MOD 30 MIN: CPT | Performed by: FAMILY MEDICINE

## 2024-04-26 PROCEDURE — G2211 COMPLEX E/M VISIT ADD ON: HCPCS | Performed by: FAMILY MEDICINE

## 2024-04-26 NOTE — PATIENT INSTRUCTIONS
"I'm hard on myself  I hold myself to a high standard.     Talk to yourself as you would talk to a best friend. Would you day those things to a dear friend?    I\"m use to just dealing with it myself\". \"I know it good for me to do it, it's just hard for me to do it\".    So much for a weight loss journey is mental and behavioral : consider working with a psychotherapist to assist with this.     What is your next best step?     [email or web address]       "

## 2024-04-26 NOTE — PROGRESS NOTES
"PRIMARY CARE WEIGHT MANAGEMENT PROGRESS NOTE    CHIEF COMPLAINT:  Kourtney Rondon is a 52 year old female who is following up for medical weight management.     Medications:   Wegovy 2.4 mg weekly (titrated up at last appointment). Good satiety. Some nausea sensation.     Nutrition:  Stressed and \"dealing with a lot of crap\".     Behavior:   \"I fall back into that easy bad behavior so fast\"     Physical Activity:    : good muscle mass on body composition analysis.   No longer using stair stepper.     Past psychological treatment:  Inpatient treatment:  Yes  Outpatient treatment:  Yes  Hx of substance abuse: H/o of cocaine addiction. 21 years sober.       WEIGHT HISTORY:   Wt Readings from Last 3 Encounters:   04/26/24 80.2 kg (176 lb 12.8 oz)   02/06/24 83.6 kg (184 lb 3.2 oz)   11/28/23 85 kg (187 lb 4.8 oz)     PHYSICAL EXAM:  VITAL SIGNS:  /76   Pulse 71   Ht 1.651 m (5' 5\")   Wt 80.2 kg (176 lb 12.8 oz)   SpO2 99%   BMI 29.42 kg/m      Weight at start of treatment: 221 lb   Weight last appointment: 184  lb  Weight today: 176 lb  Weight change since last appointment: - 8 lbs  Weight change since start of treatment : -45 lbs  Goal weight loss over first 6 months: - 22 lbs (-10% totally body weight) achieved.     General: no acute distress, cooperative with exam.  Psych: mental status normal, mood and affect appropriate.          ASSESSMENT/PLAN:  Kourtney Rondon is a 52 year old female who is following up for medical weight management.    History of morbid obesity  Overweight (BMI 25.0-29.9)  -     Semaglutide-Weight Management (WEGOVY) 2.4 MG/0.75ML pen; Inject 1.7 mg Subcutaneous once a week : requests 30 day refills as per insurance.     Plan for management includes the following:    -Nutrition: continue with increase protein in diet   -Exercise:  encouraged to continue incorporating strength training.    -Medications: Wegovy 2.4 mg weekly   Discussed importance of additional strength " "training to counteract muscle loss with GLP1.    Discussed and issued on AVS:  I'm hard on myself  I hold myself to a high standard.     Talk to yourself as you would talk to a best friend. Would you day those things to a dear friend?    I\"m use to just dealing with it myself\". \"I know it good for me to do it, it's just hard for me to do it\".    So much for a weight loss journey is mental and behavioral : consider working with a psychotherapist to assist with this.     What is your next best step?     [email or web address]     -consider repeating composition analysis no sooner than 3 months time.   -     MO MARIMAR WHOLE BODY COMPOSITION ASSESSMENT W/I&R    Gastroesophageal reflux disease without esophagitis  Managing in part with medical weight management     Follow up with me: 3 months.   "

## 2024-05-30 ENCOUNTER — TRANSFERRED RECORDS (OUTPATIENT)
Dept: FAMILY MEDICINE | Facility: CLINIC | Age: 54
End: 2024-05-30

## 2024-06-06 ENCOUNTER — OFFICE VISIT (OUTPATIENT)
Dept: FAMILY MEDICINE | Facility: CLINIC | Age: 54
End: 2024-06-06

## 2024-06-06 VITALS — DIASTOLIC BLOOD PRESSURE: 74 MMHG | OXYGEN SATURATION: 98 % | SYSTOLIC BLOOD PRESSURE: 110 MMHG | HEART RATE: 76 BPM

## 2024-06-06 DIAGNOSIS — I83.813 VARICOSE VEINS OF BOTH LOWER EXTREMITIES WITH PAIN: Primary | ICD-10-CM

## 2024-06-06 PROCEDURE — G2211 COMPLEX E/M VISIT ADD ON: HCPCS | Performed by: FAMILY MEDICINE

## 2024-06-06 PROCEDURE — 99213 OFFICE O/P EST LOW 20 MIN: CPT | Performed by: FAMILY MEDICINE

## 2024-06-06 RX ORDER — SEMAGLUTIDE 2.4 MG/.75ML
2.4 INJECTION, SOLUTION SUBCUTANEOUS WEEKLY
COMMUNITY
Start: 2024-06-03 | End: 2024-08-14

## 2024-06-06 NOTE — PATIENT INSTRUCTIONS
[facility]    phone [phone].    EMAIL  [email or web address]    HOURS  Monday-Friday: 8:00AM - 4:00PM    Saturday-Sunday: Closed    Dr. Amber Wu

## 2024-06-06 NOTE — PROGRESS NOTES
SUBJECTIVE:    Kourtney Rondon, is a 53 year old female presenting for the below:     1. Bilateral varicose veins. Postal  : lots of standing and walking / heavy lifting. Symptomatic : itching.     OBJECTIVE:  Vitals:    06/06/24 1612   BP: 110/74   Pulse: 76   SpO2: 98%    There is no height or weight on file to calculate BMI.  General: no acute distress, cooperative with exam.  Lower extremities : bilateral tortuous varicosities      ASSESSMENT / PLAN:      Varicose veins of both lower extremities with pain  -     Vascular Surgery Referral; Future

## 2024-07-11 ENCOUNTER — TRANSFERRED RECORDS (OUTPATIENT)
Dept: FAMILY MEDICINE | Facility: CLINIC | Age: 54
End: 2024-07-11

## 2024-08-14 ENCOUNTER — OFFICE VISIT (OUTPATIENT)
Dept: FAMILY MEDICINE | Facility: CLINIC | Age: 54
End: 2024-08-14

## 2024-08-14 VITALS
BODY MASS INDEX: 28.71 KG/M2 | HEART RATE: 77 BPM | SYSTOLIC BLOOD PRESSURE: 114 MMHG | DIASTOLIC BLOOD PRESSURE: 80 MMHG | OXYGEN SATURATION: 98 % | WEIGHT: 172.3 LBS | HEIGHT: 65 IN

## 2024-08-14 DIAGNOSIS — L40.9 PSORIASIS: Primary | ICD-10-CM

## 2024-08-14 DIAGNOSIS — Z86.39 H/O: OBESITY: ICD-10-CM

## 2024-08-14 DIAGNOSIS — Z86.2 H/O IRON DEFICIENCY ANEMIA: ICD-10-CM

## 2024-08-14 DIAGNOSIS — K21.9 GASTROESOPHAGEAL REFLUX DISEASE WITHOUT ESOPHAGITIS: ICD-10-CM

## 2024-08-14 DIAGNOSIS — R63.4 WEIGHT LOSS: ICD-10-CM

## 2024-08-14 LAB
% GRANULOCYTES: 77.3 % (ref 42.2–75.2)
HCT VFR BLD AUTO: 37.7 % (ref 35–46)
HEMOGLOBIN: 12.9 G/DL (ref 11.8–15.5)
LYMPHOCYTES NFR BLD AUTO: 17.1 % (ref 20.5–51.1)
MCH RBC QN AUTO: 30.7 PG (ref 27–31)
MCHC RBC AUTO-ENTMCNC: 34.4 G/DL (ref 33–37)
MCV RBC AUTO: 89.4 FL (ref 80–100)
MONOCYTES NFR BLD AUTO: 5.6 % (ref 1.7–9.3)
PLATELET # BLD AUTO: 212 K/UL (ref 140–450)
RBC # BLD AUTO: 4.21 X10/CMM (ref 3.7–5.2)
VIT B12 SERPL-MCNC: 377 PG/ML (ref 232–1245)
VIT D+METAB SERPL-MCNC: 34 NG/ML (ref 20–50)
WBC # BLD AUTO: 6.2 X10/CMM (ref 3.8–11)

## 2024-08-14 PROCEDURE — 36415 COLL VENOUS BLD VENIPUNCTURE: CPT | Performed by: FAMILY MEDICINE

## 2024-08-14 PROCEDURE — 82306 VITAMIN D 25 HYDROXY: CPT | Mod: 90 | Performed by: FAMILY MEDICINE

## 2024-08-14 PROCEDURE — 99214 OFFICE O/P EST MOD 30 MIN: CPT | Mod: 25 | Performed by: FAMILY MEDICINE

## 2024-08-14 PROCEDURE — 85025 COMPLETE CBC W/AUTO DIFF WBC: CPT | Performed by: FAMILY MEDICINE

## 2024-08-14 PROCEDURE — 0358T BIA WHOLE BODY: CPT | Mod: 59 | Performed by: FAMILY MEDICINE

## 2024-08-14 PROCEDURE — 82607 VITAMIN B-12: CPT | Mod: 90 | Performed by: FAMILY MEDICINE

## 2024-08-14 PROCEDURE — 99401 PREV MED CNSL INDIV APPRX 15: CPT | Performed by: FAMILY MEDICINE

## 2024-08-14 RX ORDER — CALCIPOTRIENE 50 UG/G
CREAM TOPICAL 2 TIMES DAILY
Qty: 120 G | Refills: 0 | Status: SHIPPED | OUTPATIENT
Start: 2024-08-14 | End: 2024-09-18

## 2024-08-14 RX ORDER — SEMAGLUTIDE 2.4 MG/.75ML
2.4 INJECTION, SOLUTION SUBCUTANEOUS WEEKLY
Qty: 3 ML | Refills: 3 | Status: SHIPPED | OUTPATIENT
Start: 2024-08-14

## 2024-08-14 RX ORDER — SERTRALINE HYDROCHLORIDE 100 MG/1
1 TABLET, FILM COATED ORAL
COMMUNITY
Start: 2024-07-29

## 2024-08-14 NOTE — PROGRESS NOTES
"PRIMARY CARE WEIGHT MANAGEMENT PROGRESS NOTE    CHIEF COMPLAINT:  Kourtney Rondon is a 52 year old female who is following up for medical weight management.     Medications:   Wegovy 2.4 mg weekly.  Ongoing mood satiety. Some nausea sensation.     Nutrition/Behavior:   Switching medications with psychiatry.   \"I'm not practicing the habits as well as I should\".     Physical Activity:    : good muscle mass on body composition analysis.   No longer using stair stepper. Wanting to get back into scheduled physical activity, but finding habit change difficult due to mental health.     Past psychological treatment:  Inpatient treatment:  Yes  Outpatient treatment:  Yes  Hx of substance abuse: H/o of cocaine addiction. Many decades sober.     WEIGHT HISTORY:   Wt Readings from Last 3 Encounters:   08/14/24 78.2 kg (172 lb 4.8 oz)   04/26/24 80.2 kg (176 lb 12.8 oz)   02/06/24 83.6 kg (184 lb 3.2 oz)     PHYSICAL EXAM:  VITAL SIGNS:  /80   Pulse 77   Ht 1.651 m (5' 5\")   Wt 78.2 kg (172 lb 4.8 oz)   SpO2 98%   BMI 28.67 kg/m      Weight at start of treatment: 221 lb   Weight last appointment: 176 lb  Weight today: 172 lb  Weight change since last appointment: - 2 lbs  Weight change since start of treatment : -47 lbs    General: no acute distress, cooperative with exam.  Skin :Erythematous patches with overlying scale to temples, behind ears and nape of neck.   Psych: mental status normal, mood and affect appropriate.          ASSESSMENT/PLAN:  Kourtney Rondon is a 52 year old female who is following up for medical weight management.    History of obesity  BMI 28.0-28.9,adult  -     Semaglutide-Weight Management (WEGOVY) 2.4 MG/0.75ML pen; Inject 1.7 mg Subcutaneous once a week : requests 30 day refills as per insurance. 2 refills issued    Plan for management includes the following:    -Nutrition: focus on protein in diet   -Exercise:  encouraged to continue incorporating strength training : further " "muscle mass loss on body comp (along wth some adipose mass loss too)   -Medications: Wegovy 2.4 mg weekly : continue   Discussed importance of additional strength training to counteract muscle loss with GLP1 again discussed.         DE MARIMAR WHOLE BODY COMPOSITION ASSESSMENT W/I&R  -     WEGOVY 2.4 MG/0.75ML pen; Inject 2.4 mg subcutaneously once a week  -     VENOUS COLLECTION  -     PREVENT ,INDIV,15 MIN    Discussed and issued on AVS:  I think I've plateaued at weight loss unless I get myself more active.      Sustaining the weight you have lost during emotional times is success right there.     Consider working with a therapist on how to make sustainable changes and how you break through that 'mental barrier' and 'how to get past yourself'. This the key to getting to the next level of health and wellness.     Weight change since start of treatment : -47 lbs    -consider repeating composition analysis no sooner than 3 months time.   -     DE MARIMAR WHOLE BODY COMPOSITION ASSESSMENT W/I&R    Gastroesophageal reflux disease without esophagitis  Managing in part with medical weight management       Psoriasis  -     calcipotriene (DOVONOX) 0.005 % external cream; Apply topically 2 times daily    H/O iron deficiency anemia  Weight loss  Concerned about \"vitamin levels\" with magnitude of weight loss.   -     CBC with Diff/Plt ([facility])  -     Vitamin B12; Future  -     Vitamin D Deficiency; Future  -     VENOUS COLLECTION  -     PREVENT ,INDIV,15 MIN    Follow up with me: 6-8 weeks    The longitudinal plan of care for the diagnosis(es)/condition(s) as documented were addressed during this visit. Due to the added complexity in care, I will continue to support Kourtney in the subsequent management and with ongoing continuity of care.  "

## 2024-08-14 NOTE — PATIENT INSTRUCTIONS
I think I've plateaued at weight loss unless I get myself more active.      Sustaining the weight you have lost during emotional times is success right there.     Consider working with a therapist on how to make stable changes and how you break through that 'mental barrier' and 'how to get past yourself'. This the key to getting to the next level  of health and wellness.     Weight change since start of treatment : -47 lbs

## 2024-08-20 ENCOUNTER — TRANSFERRED RECORDS (OUTPATIENT)
Dept: FAMILY MEDICINE | Facility: CLINIC | Age: 54
End: 2024-08-20

## 2024-09-18 DIAGNOSIS — L40.9 PSORIASIS: ICD-10-CM

## 2024-09-18 RX ORDER — CALCIPOTRIENE 50 UG/G
CREAM TOPICAL 2 TIMES DAILY
Qty: 120 G | Refills: 0 | Status: SHIPPED | OUTPATIENT
Start: 2024-09-18

## 2024-09-18 NOTE — TELEPHONE ENCOUNTER
Calcipotriene 0.005% Cream    Last visit 08-14-24  Has CPX scheduled 10-11-24    [facility]

## 2024-11-06 ENCOUNTER — OFFICE VISIT (OUTPATIENT)
Dept: FAMILY MEDICINE | Facility: CLINIC | Age: 54
End: 2024-11-06

## 2024-11-06 VITALS
BODY MASS INDEX: 29.5 KG/M2 | HEART RATE: 70 BPM | HEIGHT: 64 IN | OXYGEN SATURATION: 98 % | DIASTOLIC BLOOD PRESSURE: 80 MMHG | SYSTOLIC BLOOD PRESSURE: 121 MMHG | WEIGHT: 172.8 LBS

## 2024-11-06 DIAGNOSIS — K21.9 GASTROESOPHAGEAL REFLUX DISEASE WITHOUT ESOPHAGITIS: ICD-10-CM

## 2024-11-06 DIAGNOSIS — Z86.39 HISTORY OF MORBID OBESITY: ICD-10-CM

## 2024-11-06 DIAGNOSIS — E66.3 OVERWEIGHT (BMI 25.0-29.9): Primary | ICD-10-CM

## 2024-11-06 PROCEDURE — 0358T BIA WHOLE BODY: CPT | Mod: 59 | Performed by: FAMILY MEDICINE

## 2024-11-06 PROCEDURE — 99214 OFFICE O/P EST MOD 30 MIN: CPT | Mod: 25 | Performed by: FAMILY MEDICINE

## 2024-11-06 PROCEDURE — 99401 PREV MED CNSL INDIV APPRX 15: CPT | Performed by: FAMILY MEDICINE

## 2024-11-06 RX ORDER — PROGESTERONE 200 MG/1
CAPSULE ORAL
COMMUNITY
Start: 2024-10-03

## 2024-11-06 RX ORDER — ESTRADIOL 0.05 MG/D
PATCH, EXTENDED RELEASE TRANSDERMAL
COMMUNITY
Start: 2024-10-03

## 2024-11-06 NOTE — PROGRESS NOTES
"PRIMARY CARE WEIGHT MANAGEMENT PROGRESS NOTE    CHIEF COMPLAINT:  Kourtney Rondon is a 52 year old female who is following up for medical weight management.     Medications:   Wegovy 2.4 mg weekly.  Recently started HRT : wondering if affecting appetite somewhat.     Nutrition/Behavior:   Continues to work with psych team on mental health. Working with therapist.     Physical Activity:    : good muscle mass on body composition analysis.   Wanting to get back into scheduled physical activity, continues to find habit change difficult due to mental health.     Past psychological treatment:  Inpatient treatment:  Yes  Outpatient treatment:  Yes  Hx of substance abuse: H/o of cocaine addiction. Many decades sober.     WEIGHT HISTORY:   Wt Readings from Last 3 Encounters:   11/06/24 78.4 kg (172 lb 12.8 oz)   08/14/24 78.2 kg (172 lb 4.8 oz)   04/26/24 80.2 kg (176 lb 12.8 oz)     PHYSICAL EXAM:  VITAL SIGNS:  /80   Pulse 70   Ht 1.626 m (5' 4\")   Wt 78.4 kg (172 lb 12.8 oz)   SpO2 98%   BMI 29.66 kg/m      Weight at start of treatment: 221 lb   Weight last appointment: 172 lb  Weight today: 172 lb  Weight change since last appointment: weight neutral   Weight change since start of treatment : -47 lbs    General: no acute distress, cooperative with exam.  Skin :Erythematous patches with overlying scale to temples, behind ears and nape of neck.   Psych: mental status normal, mood and affect appropriate.          ASSESSMENT/PLAN:  Kourtney Rondon is a 52 year old female who is following up for medical weight management.    History of obesity  BMI 28.0-28.9,adult  -     Semaglutide-Weight Management (WEGOVY) 2.4 MG/0.75ML pen; Inject 1.7    Plan for management includes the following:    -Nutrition: focus on protein in diet   -Exercise:  muscle mass stable on body comp.    -Medications: Wegovy 2.4 mg weekly : continue         OR MARIMAR WHOLE BODY COMPOSITION ASSESSMENT W/I&R  -     WEGOVY 2.4 MG/0.75ML pen; " Inject 2.4 mg subcutaneously once a week  -     VENOUS COLLECTION  -     PREVENT ,INDIV,15 MIN    Weight change since start of treatment : -47 lbs    -consider repeating composition analysis no sooner than 3 months time.     Gastroesophageal reflux disease without esophagitis  Managing in part with medical weight management       Follow up : 3 months medical weight management   Completes physicals with OBGyn.     The longitudinal plan of care for the diagnosis(es)/condition(s) as documented were addressed during this visit. Due to the added complexity in care, I will continue to support Kourtney in the subsequent management and with ongoing continuity of care.

## 2024-12-13 DIAGNOSIS — L40.9 PSORIASIS: ICD-10-CM

## 2024-12-13 NOTE — TELEPHONE ENCOUNTER
Med: Calcipotriene    LOV (related): 8/14/24      Due for F/U around: Not Specified    Next Appt: 12/18/24

## 2024-12-14 RX ORDER — CALCIPOTRIENE 50 UG/G
CREAM TOPICAL 2 TIMES DAILY
Qty: 120 G | Refills: 0 | Status: SHIPPED | OUTPATIENT
Start: 2024-12-14

## 2024-12-16 ENCOUNTER — TELEPHONE (OUTPATIENT)
Dept: FAMILY MEDICINE | Facility: CLINIC | Age: 54
End: 2024-12-16

## 2024-12-16 NOTE — TELEPHONE ENCOUNTER
[facility]. Prior authorization approved with approval dates 11/16/24-12/16/2025. Leia Asher

## 2024-12-19 ENCOUNTER — TRANSFERRED RECORDS (OUTPATIENT)
Dept: FAMILY MEDICINE | Facility: CLINIC | Age: 54
End: 2024-12-19

## 2024-12-27 ENCOUNTER — TRANSFERRED RECORDS (OUTPATIENT)
Dept: FAMILY MEDICINE | Facility: CLINIC | Age: 54
End: 2024-12-27

## 2024-12-29 ENCOUNTER — HEALTH MAINTENANCE LETTER (OUTPATIENT)
Age: 54
End: 2024-12-29

## 2025-01-15 ENCOUNTER — OFFICE VISIT (OUTPATIENT)
Dept: FAMILY MEDICINE | Facility: CLINIC | Age: 55
End: 2025-01-15

## 2025-01-15 VITALS
OXYGEN SATURATION: 99 % | WEIGHT: 171.2 LBS | HEIGHT: 65 IN | SYSTOLIC BLOOD PRESSURE: 95 MMHG | BODY MASS INDEX: 28.52 KG/M2 | DIASTOLIC BLOOD PRESSURE: 68 MMHG | HEART RATE: 73 BPM

## 2025-01-15 DIAGNOSIS — Z02.89 ENCOUNTER FOR COMPLETION OF FORM WITH PATIENT: ICD-10-CM

## 2025-01-15 DIAGNOSIS — Z86.39 H/O: OBESITY: ICD-10-CM

## 2025-01-15 PROCEDURE — G2211 COMPLEX E/M VISIT ADD ON: HCPCS | Performed by: FAMILY MEDICINE

## 2025-01-15 PROCEDURE — 99214 OFFICE O/P EST MOD 30 MIN: CPT | Performed by: FAMILY MEDICINE

## 2025-01-15 NOTE — PROGRESS NOTES
"SUBJECTIVE:    Kourtney Rondon, is a 54 year old female presenting for the below:     -needs extension of FMLA paperwork for restrictions related to right arm S/p right ulnar nerve surgery and tendon release surgery March 2022.   Current orthopedic provider who usually does this is moving and declines to complete as leaving state. In between orthopedic providers.       -mwm : insurance have increased tear cost of injectibles. Has 3 months of wegovy 2.4 mg left. Will need to transition to alternative weight loss medication for ongoing weight loss maintenance.     OBJECTIVE:  Vitals:    01/15/25 0845   BP: 95/68   Pulse: 73   SpO2: 99%   Weight: 77.7 kg (171 lb 3.2 oz)   Height: 1.651 m (5' 5\")    Body mass index is 28.49 kg/m .  General: no acute distress, cooperative with exam.  Psych: mental status normal, mood and affect appropriate.      ASSESSMENT / PLAN:      BMI 28.0-28.9,adult  H/O: obesity  Continue wegovy 2.4 mg weekly for now : has 3 months left : discussed extending out dosing to every 8 to 10 days in attempt to ween off and extend usage   Endorses brain chatter about food : consider transitioning to Topamax in 3 months : Mechanism of action, common side effects and how to take discussed.   H/o nephrolithiasis : but related this to a time was ingesting high amounts of calcium.    Encounter for completion of form with patient  Letter for FMLA extension issued for further 6 months.   Discussed further FMLA and extensions and should come from future established orthopedic provider  Diagnosis : C6 radiculopathy and right CMC osteoarthritis   Restrictions : 32 hour work week / no extra mail or packages.     Follow up:  Appointments in Next Year        Apr 15, 2025 9:30 AM  SHORT with Hilary Abraham MD  [facility] ([facility]) [phone]     The longitudinal plan of care for the diagnosis(es)/condition(s) as documented were addressed during this visit. Due to the added complexity in " care, I will continue to support Kourtney in the subsequent management and with ongoing continuity of care.

## 2025-01-15 NOTE — LETTER
1/15/2025    Kourtney Rondon   1970        To Whom it May Concern;    Please be aware of extension of [employer] restrictions.     Diagnosis : C6 radiculopathy and right CMC osteoarthritis   Restrictions : 32 hour work week / no extra mail or packages.     This should be extended to Justyna 15 2025.     Sincerely,        Hilary Abraham MD

## 2025-02-18 ENCOUNTER — OFFICE VISIT (OUTPATIENT)
Dept: FAMILY MEDICINE | Facility: CLINIC | Age: 55
End: 2025-02-18

## 2025-02-18 VITALS
DIASTOLIC BLOOD PRESSURE: 72 MMHG | WEIGHT: 173.8 LBS | HEIGHT: 65 IN | OXYGEN SATURATION: 98 % | BODY MASS INDEX: 28.96 KG/M2 | HEART RATE: 74 BPM | SYSTOLIC BLOOD PRESSURE: 107 MMHG

## 2025-02-18 DIAGNOSIS — K21.9 GASTROESOPHAGEAL REFLUX DISEASE WITHOUT ESOPHAGITIS: ICD-10-CM

## 2025-02-18 DIAGNOSIS — E66.3 OVERWEIGHT (BMI 25.0-29.9): ICD-10-CM

## 2025-02-18 DIAGNOSIS — Z86.39 H/O: OBESITY: ICD-10-CM

## 2025-02-18 PROCEDURE — 99214 OFFICE O/P EST MOD 30 MIN: CPT | Mod: 25 | Performed by: FAMILY MEDICINE

## 2025-02-18 PROCEDURE — 0358T BIA WHOLE BODY: CPT | Performed by: FAMILY MEDICINE

## 2025-02-18 NOTE — PROGRESS NOTES
"PRIMARY CARE WEIGHT MANAGEMENT PROGRESS NOTE    CHIEF COMPLAINT:  Kourtney Rondon is a 52 year old female who is following up for medical weight management.     Medications:   Wegovy 2.4 mg weekly. May not have coverage in the new year. Has 2 months of treatment at home.      Nutrition/Behavior:   Continues to work with psych team on mental health. Working with therapist.     Physical Activity:    : good muscle mass on body composition analysis.   Bought treadmill. 30 mins most days.     Past psychological treatment:  Now working with therapist consistently. Menopause affecting mood.   Feels emotional journey is affecting adherence to lifestyle management the most.   Inpatient treatment:  Yes  Outpatient treatment:  Yes  Hx of substance abuse: H/o of cocaine addiction. Many decades sober.     WEIGHT HISTORY:   Wt Readings from Last 3 Encounters:   02/18/25 78.8 kg (173 lb 12.8 oz)   01/15/25 77.7 kg (171 lb 3.2 oz)   01/03/25 78.3 kg (172 lb 9.6 oz)     PHYSICAL EXAM:  VITAL SIGNS:  /72   Pulse 74   Ht 1.651 m (5' 5\")   Wt 78.8 kg (173 lb 12.8 oz)   SpO2 98%   BMI 28.92 kg/m      Weight at start of treatment: 221 lb   Weight last appointment: 172 lb  Weight today: 173 lb  Weight change since last appointment: weight neutral   Weight change since start of treatment : -46 lbs    General: no acute distress, cooperative with exam.  Psych: mental status normal, mood and affect appropriate.          ASSESSMENT/PLAN:  Kourtney Rondon is a 52 year old female who is following up for medical weight management.    History of obesity  BMI 28.0-28.9,adult  Plan for management includes the following:    -Nutrition: focus on protein in diet   -Exercise:  muscle mass reduced a little.    -Medications: Wegovy 2.4 mg weekly : continue :  May not have coverage in the new year. Has 2 months of treatment at home.      -     PREVENT ,ADRIENNE,15 MIN  Weight change since start of treatment : -46 lbs : stable at " this lower set point body with for some time now.   -consider repeating composition analysis no sooner than 3 months time.     Gastroesophageal reflux disease without esophagitis  Managing in part with medical weight management       Follow up : 1 month medical weight management : may need to switch to compounded semaglutide via work at that time.     Completes physicals with OBGyn.     The longitudinal plan of care for the diagnosis(es)/condition(s) as documented were addressed during this visit. Due to the added complexity in care, I will continue to support Kourtney in the subsequent management and with ongoing continuity of care.

## 2025-03-19 DIAGNOSIS — L40.9 PSORIASIS: ICD-10-CM

## 2025-03-19 NOTE — CONFIDENTIAL NOTE
Med: [employer] [employer]    LOV (related): 8/14/24 - DERM / [facility]      Due for F/U around: 3/2025 FOR [facility]    Next Appt: 4/15/25

## 2025-03-20 RX ORDER — CALCIPOTRIENE 50 UG/G
CREAM TOPICAL 2 TIMES DAILY
Qty: 120 G | Refills: 0 | Status: SHIPPED | OUTPATIENT
Start: 2025-03-20

## 2025-04-15 ENCOUNTER — OFFICE VISIT (OUTPATIENT)
Dept: FAMILY MEDICINE | Facility: CLINIC | Age: 55
End: 2025-04-15

## 2025-04-15 VITALS
OXYGEN SATURATION: 96 % | HEART RATE: 72 BPM | DIASTOLIC BLOOD PRESSURE: 75 MMHG | BODY MASS INDEX: 28.09 KG/M2 | SYSTOLIC BLOOD PRESSURE: 112 MMHG | WEIGHT: 168.8 LBS

## 2025-04-15 DIAGNOSIS — Z86.39 H/O: OBESITY: ICD-10-CM

## 2025-04-15 DIAGNOSIS — K21.9 GASTROESOPHAGEAL REFLUX DISEASE WITHOUT ESOPHAGITIS: Primary | ICD-10-CM

## 2025-04-15 DIAGNOSIS — E66.3 OVERWEIGHT (BMI 25.0-29.9): ICD-10-CM

## 2025-04-15 PROCEDURE — 3078F DIAST BP <80 MM HG: CPT | Performed by: FAMILY MEDICINE

## 2025-04-15 PROCEDURE — 99214 OFFICE O/P EST MOD 30 MIN: CPT | Mod: 25 | Performed by: FAMILY MEDICINE

## 2025-04-15 PROCEDURE — 3074F SYST BP LT 130 MM HG: CPT | Performed by: FAMILY MEDICINE

## 2025-04-15 PROCEDURE — 99401 PREV MED CNSL INDIV APPRX 15: CPT | Performed by: FAMILY MEDICINE

## 2025-04-15 NOTE — PROGRESS NOTES
PRIMARY CARE WEIGHT MANAGEMENT PROGRESS NOTE    CHIEF COMPLAINT:  Kourtney Rondon is a 52 year old female who is following up for medical weight management.     Medications:   Wegovy 2.4 mg weekly. Insurance covering till end of year (with tier exemption) now. Ongoing good effect.     Nutrition/Behavior:   Continues to work with psych team on mental health. Working with therapist.     Physical Activity:    : good muscle mass on body composition analysis.   Using walking pad : 30 mins most days.     Past psychological treatment:  Now working with therapist consistently : has appointment with psychologist today and psychologist tomorrow.  Menopause affecting mood.   Feels emotional journey is affecting adherence to lifestyle management the most.   Inpatient treatment:  Yes  Outpatient treatment:  Yes  Hx of substance abuse: H/o of cocaine addiction. Many decades sober.     WEIGHT HISTORY:   Wt Readings from Last 3 Encounters:   04/15/25 76.6 kg (168 lb 12.8 oz)   02/18/25 78.8 kg (173 lb 12.8 oz)   01/15/25 77.7 kg (171 lb 3.2 oz)     PHYSICAL EXAM:  VITAL SIGNS:  /75   Pulse 72   Wt 76.6 kg (168 lb 12.8 oz)   SpO2 96%   BMI 28.09 kg/m      Weight at start of treatment: 221 lb   Weight last appointment: 173 lb  Weight today: 168 lb  Weight change since last appointment: -5 lbs  Weight change since start of treatment : -51 lbs    General: no acute distress, cooperative with exam.  Psych: mental status normal, mood and affect appropriate.          ASSESSMENT/PLAN:  Kourtney Rondon is a 52 year old female who is following up for medical weight management.    History of obesity  BMI 28.0-28.9,adult  Overweight (BMI 25.0-29.9)   Plan for management includes the following:    -Nutrition: focus on protein in diet   -Exercise:  muscle mass reduced a little on prior body comp : again enforced importance of muscle mass maintenance and strength training.     -Medications: Wegovy 2.4 mg weekly : continue.  -      PREVENT ,INDIV,15 MIN    Weight change since start of treatment : -51 lbs     -consider repeating composition analysis no sooner than 3 months time.     Gastroesophageal reflux disease without esophagitis  Managing in part with medical weight management       Follow up : 3 month medical weight management.      Completes physicals with OBGyn.     The longitudinal plan of care for the diagnosis(es)/condition(s) as documented were addressed during this visit. Due to the added complexity in care, I will continue to support Kourtney in the subsequent management and with ongoing continuity of care.

## 2025-05-22 ENCOUNTER — OFFICE VISIT (OUTPATIENT)
Dept: FAMILY MEDICINE | Facility: CLINIC | Age: 55
End: 2025-05-22

## 2025-05-22 VITALS
HEART RATE: 77 BPM | BODY MASS INDEX: 28.76 KG/M2 | DIASTOLIC BLOOD PRESSURE: 62 MMHG | SYSTOLIC BLOOD PRESSURE: 122 MMHG | WEIGHT: 172.8 LBS | OXYGEN SATURATION: 98 %

## 2025-05-22 DIAGNOSIS — S61.259A DOG BITE OF FINGER, INITIAL ENCOUNTER: Primary | ICD-10-CM
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
## 2025-05-22 RX ORDER — SEMAGLUTIDE 2.4 MG/.75ML
2.4 INJECTION, SOLUTION SUBCUTANEOUS
COMMUNITY
Start: 2025-05-21

## 2025-05-22 NOTE — PROGRESS NOTES
"  Assessment & Plan     Dog bite of finger, initial encounter  Patient presents following a dog bite. TDAP up to date. Dog was up to date on rabies vaccine. Wounds were cleansed and dressed in clinic with antibiotic ointment and nonadherent dressings. Discussed wound management. Recommend bacitracin ointment to affected area 1-2 times daily. Reviewed monitoring for signs of an infection including increasing redness, swelling, pain or abnormal discharge. Will complete course of prophylaxis antibiotics. Rx for Augmentin. Side effects reviewed along with recommendations to take with food. Red flags that warrant emergent evaluation discussed. Follow up as needed for new or worsening symptoms or if symptoms fail to improve. Patient agreeable to plan. All questions answered.   - amoxicillin-clavulanate (AUGMENTIN) 875-125 MG tablet  Dispense: 10 tablet; Refill: 0            BMI  Estimated body mass index is 28.76 kg/m  as calculated from the following:    Height as of 2/18/25: 1.651 m (5' 5\").    Weight as of this encounter: 78.4 kg (172 lb 12.8 oz).   Weight management plan: Discussed healthy diet and exercise guidelines      Follow-up  Return if symptoms worsen or fail to improve, for Follow up.    Subjective   Kourtney is a 54 year old, presenting for the following health issues:  Dog Bite (Was bit by her dog earlier this afternoon on her fingers, dog is UTD on rabies shots, has been having difficulty getting blood to stop)    History of Present Illness       Reason for visit:  Dog bite   She is taking medications regularly.          Concern - dog bite  Onset: Today around 11 am  Description: Her dog.  coming, they don't like them.  met her dogs in the back. Dog excited them went for the  and dog turned its head and bite her. Has a pitbull. Dog is up to date on rabies. Tdap was within the past 5 years. Rinsed out her bite in the sink. Bite on right hand 2nd and third finger. Has fingers " currently wrapped and put pressure on.   Intensity: doesn't hurt right now but chunk of skin out that hurts.   Progression of Symptoms:  same  Accompanying Signs & Symptoms: Bite with bleeding to 2nd and 3rd finger of right hand. 2nd finger about the size of a quarter. No concern for broken bone  Previous history of similar problem: none  Precipitating factors:        Worsened by: with 2nd finger chunk of skin  Alleviating factors:        Improved by: wrapped and applied pressure  Therapies tried and outcome: washed and wrapped it        Review of Systems  Constitutional, HEENT, cardiovascular, pulmonary, gi and gu systems are negative, except as otherwise noted.      Objective    /62   Pulse 77   Wt 78.4 kg (172 lb 12.8 oz)   SpO2 98%   BMI 28.76 kg/m    Body mass index is 28.76 kg/m .  Physical Exam   GENERAL: alert and no distress  EYES: Eyes grossly normal to inspection, PERRL and conjunctivae and sclerae normal  RESP: respirations even and unlabored   SKIN: Wounds to left hand 2nd and 3rd digits. See photo below of 2nd digit wound. Third digit with two 2-3 mm anterior puncture wounds and superficial posterior 1 cm x 1 cm wound with skin flap covering  PSYCH: mentation appears normal, affect normal/bright      Left hand 2nd digit wound           Signed Electronically by: CARLITA Degroot CNP

## 2025-07-03 ENCOUNTER — TRANSFERRED RECORDS (OUTPATIENT)
Dept: FAMILY MEDICINE | Facility: CLINIC | Age: 55
End: 2025-07-03

## 2025-07-24 ENCOUNTER — OFFICE VISIT (OUTPATIENT)
Dept: FAMILY MEDICINE | Facility: CLINIC | Age: 55
End: 2025-07-24

## 2025-07-24 VITALS
OXYGEN SATURATION: 99 % | WEIGHT: 169.2 LBS | SYSTOLIC BLOOD PRESSURE: 108 MMHG | BODY MASS INDEX: 28.16 KG/M2 | HEART RATE: 75 BPM | DIASTOLIC BLOOD PRESSURE: 63 MMHG

## 2025-07-24 DIAGNOSIS — K21.9 GASTROESOPHAGEAL REFLUX DISEASE WITHOUT ESOPHAGITIS: ICD-10-CM

## 2025-07-24 DIAGNOSIS — Z86.39 H/O: OBESITY: Primary | ICD-10-CM

## 2025-07-24 DIAGNOSIS — E66.3 OVERWEIGHT (BMI 25.0-29.9): ICD-10-CM

## 2025-07-24 RX ORDER — LACTOBACILLUS RHAMNOSUS GG 10B CELL
1 CAPSULE ORAL 2 TIMES DAILY
COMMUNITY

## 2025-07-24 RX ORDER — FEXOFENADINE HCL 60 MG/1
60 TABLET, FILM COATED ORAL PRN
COMMUNITY

## 2025-07-24 NOTE — PROGRESS NOTES
PRIMARY CARE WEIGHT MANAGEMENT PROGRESS NOTE    CHIEF COMPLAINT:  Kourtney Rondon is a 52 year old female who is following up for medical weight management.     Medications:   Wegovy 2.4 mg weekly. Insurance covering till end of year (with tier exemption) now. Ongoing good effect.     Nutrition/Behavior:   Continues to work with psych team on mental health. Working with therapist.     Physical Activity:    : good muscle mass on body composition analysis.   Using walking pad : 30 mins most days.     Past psychological treatment:  Now working with therapist consistently : has appointment with psychologist today and psychologist tomorrow.  Menopause affecting mood.   Feels emotional journey is affecting adherence to lifestyle management the most.   Inpatient treatment:  Yes  Outpatient treatment:  Yes  Hx of substance abuse: H/o of cocaine addiction. Many decades sober.     WEIGHT HISTORY:   Wt Readings from Last 3 Encounters:   07/24/25 76.7 kg (169 lb 3.2 oz)   05/22/25 78.4 kg (172 lb 12.8 oz)   04/15/25 76.6 kg (168 lb 12.8 oz)     PHYSICAL EXAM:  VITAL SIGNS:  /63   Pulse 75   Wt 76.7 kg (169 lb 3.2 oz)   SpO2 99%   BMI 28.16 kg/m      Weight at start of treatment: 221 lb   Weight last appointment: 168 lb  Weight today: 169 lb  Weight change since last appointment: +1  lb  Weight change since start of treatment : -52 lbs    General: no acute distress, cooperative with exam.  Psych: mental status normal, mood and affect appropriate.          ASSESSMENT/PLAN:  Kourtney Rondon is a 52 year old female who is following up for medical weight management.    History of obesity  BMI 28.0-28.9,adult  Overweight (BMI 25.0-29.9)   Plan for management includes the following:    -Nutrition: focus on protein in diet   -Exercise:  muscle mass reduced a little on prior body comp : again enforced importance of muscle mass maintenance and strength training.     -Medications: Wegovy 2.4 mg weekly :  continue.      Weight change since start of treatment : -51 lbs   -consider repeating composition analysis no sooner than 3 months time.     Gastroesophageal reflux disease without esophagitis  Managing in part with medical weight management     Follow up : December 2025 annual physical with [facility]      The longitudinal plan of care for the diagnosis(es)/condition(s) as documented were addressed during this visit. Due to the added complexity in care, I will continue to support Kourtney in the subsequent management and with ongoing continuity of care.

## 2025-07-24 NOTE — TELEPHONE ENCOUNTER
Pt said she forgot to ask for refills at her appnt today. Needs Refill of Wegovy month by month until December 5th when she has her next appnt. Pharmacy is [facility].

## 2025-07-25 NOTE — TELEPHONE ENCOUNTER
Med: Wegovy    LOV (related): 7/24/25      Due for F/U around: 12/2025 [facility] & [facility]    Next Appt: 12/5/25        Wt Readings from Last 2 Encounters:   07/24/25 76.7 kg (169 lb 3.2 oz)   05/22/25 78.4 kg (172 lb 12.8 oz)       BMI Readings from Last 2 Encounters:   07/24/25 28.16 kg/m    05/22/25 28.76 kg/m

## 2025-07-26 RX ORDER — SEMAGLUTIDE 2.4 MG/.75ML
2.4 INJECTION, SOLUTION SUBCUTANEOUS WEEKLY
Qty: 3 ML | Refills: 4 | Status: SHIPPED | OUTPATIENT
Start: 2025-07-26